# Patient Record
Sex: FEMALE | Race: OTHER | NOT HISPANIC OR LATINO | URBAN - METROPOLITAN AREA
[De-identification: names, ages, dates, MRNs, and addresses within clinical notes are randomized per-mention and may not be internally consistent; named-entity substitution may affect disease eponyms.]

---
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
## 2020-03-24 ENCOUNTER — LABORATORY RESULT (OUTPATIENT)
Age: 20
End: 2020-03-24

## 2020-03-24 ENCOUNTER — APPOINTMENT (OUTPATIENT)
Dept: PEDIATRIC NEPHROLOGY | Facility: CLINIC | Age: 20
End: 2020-03-24
Payer: COMMERCIAL

## 2020-03-24 VITALS
HEART RATE: 59 BPM | HEIGHT: 61.5 IN | DIASTOLIC BLOOD PRESSURE: 62 MMHG | BODY MASS INDEX: 27.51 KG/M2 | SYSTOLIC BLOOD PRESSURE: 164 MMHG | WEIGHT: 147.6 LBS

## 2020-03-24 VITALS — HEART RATE: 55 BPM

## 2020-03-24 PROCEDURE — 99214 OFFICE O/P EST MOD 30 MIN: CPT

## 2020-03-24 NOTE — REASON FOR VISIT
[Follow-Up] : a follow-up visit for [Mother] : mother [FreeTextEntry3] : mid aortic syndrome, renal scars and HTN

## 2020-03-24 NOTE — BIRTH HISTORY
[Premature] : premature [Normal Vaginal Route] : by normal vaginal route [de-identified] : 32 weeks [FreeTextEntry1] : 4lbs 8oz [FreeTextEntry4] : NICU for prematurity

## 2020-03-24 NOTE — PHYSICAL EXAM
[Well Developed] : well developed [Well Nourished] : well nourished [Normal] : alert, oriented as age-appropriate, affect appropriate; no weakness, no facial asymmetry, moves all extremities normal gait- child older than 18 months [de-identified] : no bruit

## 2020-03-31 ENCOUNTER — APPOINTMENT (OUTPATIENT)
Dept: PEDIATRIC NEPHROLOGY | Facility: CLINIC | Age: 20
End: 2020-03-31
Payer: COMMERCIAL

## 2020-03-31 VITALS — SYSTOLIC BLOOD PRESSURE: 93 MMHG | DIASTOLIC BLOOD PRESSURE: 67 MMHG

## 2020-03-31 VITALS — DIASTOLIC BLOOD PRESSURE: 50 MMHG | SYSTOLIC BLOOD PRESSURE: 110 MMHG

## 2020-03-31 PROCEDURE — 99442: CPT | Mod: 95

## 2020-03-31 RX ORDER — HYDROCHLOROTHIAZIDE 12.5 MG/1
12.5 CAPSULE ORAL TWICE DAILY
Refills: 0 | Status: DISCONTINUED | COMMUNITY
Start: 2018-05-09 | End: 2020-03-31

## 2020-03-31 NOTE — REASON FOR VISIT
[Follow-Up] : a follow-up visit for [Mother] : mother [Patient] : patient [FreeTextEntry3] : mid aortic syndrome, reflux nephropathy, HTN. telephonic visit 3:45pm ()

## 2020-04-07 ENCOUNTER — APPOINTMENT (OUTPATIENT)
Dept: PEDIATRIC NEPHROLOGY | Facility: CLINIC | Age: 20
End: 2020-04-07
Payer: COMMERCIAL

## 2020-04-07 LAB
ANION GAP SERPL CALC-SCNC: 15 MMOL/L
BUN SERPL-MCNC: 16 MG/DL
CALCIUM SERPL-MCNC: 9.7 MG/DL
CHLORIDE SERPL-SCNC: 103 MMOL/L
CO2 SERPL-SCNC: 22 MMOL/L
CREAT SERPL-MCNC: 1.1 MG/DL
CREAT SPEC-SCNC: 99 MG/DL
CREAT/PROT UR: 0.1 RATIO
GLUCOSE SERPL-MCNC: 91 MG/DL
POTASSIUM SERPL-SCNC: 4.5 MMOL/L
PROT UR-MCNC: 11 MG/DLG/24H
SODIUM SERPL-SCNC: 140 MMOL/L

## 2020-04-07 PROCEDURE — 99442: CPT

## 2020-04-07 RX ORDER — NIFEDIPINE 60 MG/1
60 TABLET, EXTENDED RELEASE ORAL DAILY
Qty: 60 | Refills: 0 | Status: DISCONTINUED | COMMUNITY
Start: 2020-03-24 | End: 2020-03-31

## 2020-04-07 NOTE — REASON FOR VISIT
[Follow-Up] : a follow-up visit for [Patient] : patient [FreeTextEntry3] : \par mid aortic syndrome, reflux nephropathy, HTN. PERNELL apt. 4:04pm (frankie@ [phone])

## 2020-05-12 ENCOUNTER — APPOINTMENT (OUTPATIENT)
Dept: PEDIATRIC NEPHROLOGY | Facility: CLINIC | Age: 20
End: 2020-05-12

## 2020-05-19 ENCOUNTER — APPOINTMENT (OUTPATIENT)
Dept: PEDIATRIC NEPHROLOGY | Facility: CLINIC | Age: 20
End: 2020-05-19

## 2020-05-26 ENCOUNTER — RX RENEWAL (OUTPATIENT)
Age: 20
End: 2020-05-26

## 2020-06-16 ENCOUNTER — APPOINTMENT (OUTPATIENT)
Dept: PEDIATRIC NEPHROLOGY | Facility: CLINIC | Age: 20
End: 2020-06-16
Payer: COMMERCIAL

## 2020-06-16 VITALS
BODY MASS INDEX: 27.46 KG/M2 | WEIGHT: 149.2 LBS | DIASTOLIC BLOOD PRESSURE: 80 MMHG | SYSTOLIC BLOOD PRESSURE: 154 MMHG | HEIGHT: 62 IN

## 2020-06-16 PROCEDURE — 99214 OFFICE O/P EST MOD 30 MIN: CPT

## 2020-06-16 NOTE — REASON FOR VISIT
[Follow-Up] : a follow-up visit for [Mother] : mother [Patient] : patient [FreeTextEntry3] : hx of mid aortic syndrome, reflux nephropathy and HTN

## 2020-06-16 NOTE — PHYSICAL EXAM
[Well Developed] : well developed [Well Nourished] : well nourished [Normal] : alert, oriented as age-appropriate, affect appropriate; no weakness, no facial asymmetry, moves all extremities normal gait- child older than 18 months [de-identified] : TM not examined [de-identified] : +abdominal bruit

## 2020-07-21 ENCOUNTER — APPOINTMENT (OUTPATIENT)
Dept: PEDIATRIC NEPHROLOGY | Facility: CLINIC | Age: 20
End: 2020-07-21
Payer: COMMERCIAL

## 2020-07-21 VITALS
SYSTOLIC BLOOD PRESSURE: 148 MMHG | BODY MASS INDEX: 28.25 KG/M2 | DIASTOLIC BLOOD PRESSURE: 84 MMHG | HEART RATE: 59 BPM | WEIGHT: 153.5 LBS | HEIGHT: 62 IN

## 2020-07-21 PROCEDURE — 99214 OFFICE O/P EST MOD 30 MIN: CPT

## 2020-07-21 PROCEDURE — 81003 URINALYSIS AUTO W/O SCOPE: CPT | Mod: QW

## 2020-07-21 NOTE — REASON FOR VISIT
[Follow-Up] : a follow-up visit for [Mother] : mother [Patient] : patient [FreeTextEntry3] : mid aortic syndrome, reflux nephropathy, HTN

## 2020-07-21 NOTE — PHYSICAL EXAM
[Well Developed] : well developed [Well Nourished] : well nourished [Normal] : alert, oriented as age-appropriate, affect appropriate; no weakness, no facial asymmetry, moves all extremities normal gait- child older than 18 months [de-identified] : TM not examined [de-identified] : +abdominal bruit

## 2020-07-29 LAB
BILIRUB UR QL STRIP: NEGATIVE
CLARITY UR: CLEAR
COLLECTION METHOD: NORMAL
GLUCOSE UR-MCNC: NEGATIVE
HCG UR QL: 0.2 EU/DL
HGB UR QL STRIP.AUTO: NORMAL
KETONES UR-MCNC: NEGATIVE
LEUKOCYTE ESTERASE UR QL STRIP: NORMAL
NITRITE UR QL STRIP: NORMAL
PH UR STRIP: 5.5
PROT UR STRIP-MCNC: NEGATIVE
SP GR UR STRIP: 1.01

## 2020-08-06 ENCOUNTER — OUTPATIENT (OUTPATIENT)
Dept: OUTPATIENT SERVICES | Facility: HOSPITAL | Age: 20
LOS: 1 days | Discharge: HOME | End: 2020-08-06
Payer: COMMERCIAL

## 2020-08-06 DIAGNOSIS — K08.409 PARTIAL LOSS OF TEETH, UNSPECIFIED CAUSE, UNSPECIFIED CLASS: Chronic | ICD-10-CM

## 2020-08-06 DIAGNOSIS — I77.9 DISORDER OF ARTERIES AND ARTERIOLES, UNSPECIFIED: Chronic | ICD-10-CM

## 2020-08-06 DIAGNOSIS — M31.4 AORTIC ARCH SYNDROME [TAKAYASU]: ICD-10-CM

## 2020-08-06 PROCEDURE — 76775 US EXAM ABDO BACK WALL LIM: CPT | Mod: 26

## 2020-08-25 ENCOUNTER — APPOINTMENT (OUTPATIENT)
Dept: PEDIATRIC NEPHROLOGY | Facility: CLINIC | Age: 20
End: 2020-08-25

## 2020-09-15 ENCOUNTER — RX RENEWAL (OUTPATIENT)
Age: 20
End: 2020-09-15

## 2020-10-01 ENCOUNTER — APPOINTMENT (OUTPATIENT)
Dept: UROGYNECOLOGY | Facility: CLINIC | Age: 20
End: 2020-10-01
Payer: COMMERCIAL

## 2020-10-01 VITALS
DIASTOLIC BLOOD PRESSURE: 85 MMHG | HEIGHT: 62 IN | SYSTOLIC BLOOD PRESSURE: 166 MMHG | WEIGHT: 150 LBS | HEART RATE: 57 BPM | BODY MASS INDEX: 27.6 KG/M2

## 2020-10-01 PROCEDURE — 99213 OFFICE O/P EST LOW 20 MIN: CPT

## 2020-10-01 NOTE — COUNSELING
[FreeTextEntry1] : If you feel like you have an infection it is important for you to call our office and we will arrange testing of your urine.\par \par Please continue taking Oxybutynin ER 10 mg once a day for frequency. Refills sent to your pharmacy.\par \par Please schedule an appointment for bladder scan to check that you're emptying your bladder well. \par \par Please call my office if you have any issues with the cost or side effects of the medication. \par \par Schedule a 12 months follow up med check appointment.\par

## 2020-10-01 NOTE — DISCUSSION/SUMMARY
[FreeTextEntry1] : Urge Incontinence\par Patient happy with Oxybutynin ER 10mg QD\par Refills provided. 90 days supply with 3 refills.\par Precautions reviewed.\par Bladder sono ordered for PVR check. Spoke to pt and her mom on Facetime. \par Will return in 12 months for follow up or earlier if she has any issues.\par \par \par

## 2020-11-04 ENCOUNTER — APPOINTMENT (OUTPATIENT)
Dept: PEDIATRIC CARDIOLOGY | Facility: CLINIC | Age: 20
End: 2020-11-04
Payer: COMMERCIAL

## 2020-11-04 VITALS
SYSTOLIC BLOOD PRESSURE: 182 MMHG | RESPIRATION RATE: 16 BRPM | HEART RATE: 70 BPM | DIASTOLIC BLOOD PRESSURE: 81 MMHG | OXYGEN SATURATION: 98 % | BODY MASS INDEX: 26.44 KG/M2 | WEIGHT: 145.51 LBS | HEIGHT: 62.4 IN

## 2020-11-04 PROCEDURE — 93320 DOPPLER ECHO COMPLETE: CPT

## 2020-11-04 PROCEDURE — 99072 ADDL SUPL MATRL&STAF TM PHE: CPT

## 2020-11-04 PROCEDURE — 93325 DOPPLER ECHO COLOR FLOW MAPG: CPT

## 2020-11-04 PROCEDURE — 93000 ELECTROCARDIOGRAM COMPLETE: CPT | Mod: GC

## 2020-11-04 PROCEDURE — 93303 ECHO TRANSTHORACIC: CPT

## 2020-11-04 PROCEDURE — 99215 OFFICE O/P EST HI 40 MIN: CPT | Mod: GC,25

## 2020-11-08 NOTE — PHYSICAL EXAM
[General Appearance - Alert] : alert [General Appearance - In No Acute Distress] : in no acute distress [General Appearance - Well Nourished] : well nourished [General Appearance - Well Developed] : well developed [General Appearance - Well-Appearing] : well appearing [Appearance Of Head] : the head was normocephalic [Facies] : there were no dysmorphic facial features [Sclera] : the conjunctiva were normal [Outer Ear] : the ears and nose were normal in appearance [Examination Of The Oral Cavity] : mucous membranes were moist and pink [Auscultation Breath Sounds / Voice Sounds] : breath sounds clear to auscultation bilaterally [Normal Chest Appearance] : the chest was normal in appearance [Apical Impulse] : quiet precordium with normal apical impulse [Heart Rate And Rhythm] : normal heart rate and rhythm [Heart Sounds] : normal S1 and S2 [No Murmur] : no murmurs  [Heart Sounds Gallop] : no gallops [Heart Sounds Pericardial Friction Rub] : no pericardial rub [Heart Sounds Click] : no clicks [Edema] : no edema [Capillary Refill Test] : normal capillary refill [2+] : left 2+ [1+] : left 1+ [Bruit] : a bruit was heard [Bowel Sounds] : normal bowel sounds [Abdomen Soft] : soft [Nondistended] : nondistended [Abdomen Tenderness] : non-tender [Nail Clubbing] : no clubbing  or cyanosis of the fingers [Motor Tone] : normal muscle strength and tone [Cervical Lymph Nodes Enlarged Anterior] : The anterior cervical nodes were normal [Cervical Lymph Nodes Enlarged Posterior] : The posterior cervical nodes were normal [] : no rash [Skin Lesions] : no lesions [Skin Turgor] : normal turgor [Demonstrated Behavior - Infant Nonreactive To Parents] : interactive [Mood] : mood and affect were appropriate for age [Demonstrated Behavior] : normal behavior

## 2020-11-13 NOTE — REASON FOR VISIT
[Systemic Hypertension] : systemic hypertension [Coarctation Of The Aorta] : coarctation of the aorta [Patient] : patient [Follow-Up] : a follow-up visit for [FreeTextEntry1] : MidAortic Syndrome

## 2020-11-13 NOTE — DISCUSSION/SUMMARY
[May participate in all age-appropriate activities] : [unfilled] May participate in all age-appropriate activities. [Influenza vaccine is recommended] : Influenza vaccine is recommended [Needs SBE Prophylaxis] : [unfilled] does not need bacterial endocarditis prophylaxis [FreeTextEntry1] : \par \par

## 2020-11-13 NOTE — HISTORY OF PRESENT ILLNESS
[FreeTextEntry1] : We had the pleasure of meeting Tisha Dorman for follow up evaluation at [facility] on November 4th, 2020.\par \par As you know, Tisha is a 20-year-old young lady with middle aortic syndrome and systemic hypertension s/p stent placement in the infrarenal aorta. She had a progressively diminutive descending abdominal aorta with near complete obliteration of the lumen in the infrarenal portion. Post stent placement there was pulsatile flow established and a residual gradient of 20 mm mercury. She had significant relief of lower extremity claudication but remains hypertensive for which she is on 2 antihypertensive medications (Nifedipine and Enalapril). She recently (Aug 2020) underwent US abdominal aortic doppler that revealed progressive narrowing of the aorta above the level of renal arteries, the stented area of the aorta was not well visualized. \par \par She continues to follow with nephrology for hypertension, atrophic left kidney with decreased function, bladder dysmotility and recurrent UTI infections. She is on Nifidipine 30mg once daily and Enalapril 5 mg daily. \par \par Tisha today has no complaints. She reports compliance with her medications with no chest pain, headaches, visual changes, palpitations, dyspnea, syncope or pre-syncope. She also denies symptoms of claudication.

## 2020-11-13 NOTE — CONSULT LETTER
[Today's Date] : [unfilled] [Name] : Name: [unfilled] [] : : ~~ [Today's Date:] : [unfilled] [Consult] : I had the pleasure of evaluating your patient, [unfilled]. My full evaluation follows. [Consult - Single Provider] : Thank you very much for allowing me to participate in the care of this patient. If you have any questions, please do not hesitate to contact me. [Sincerely,] : Sincerely, [FreeTextEntry4] : Benito Partida [FreeTextEntry5] : Nephrology [FreeTextEntry6] : [facility] [de-identified] : Addy Briggs MD\par Fellow, Pediatric Cardiology\par [facility]\par \par Juilen Bhatt MD\par Director, Pediatric catheterization Lab\par Health system\par , [employer]\par Telephone: [phone]\par [phone]\par \par

## 2020-11-13 NOTE — REVIEW OF SYSTEMS
[Feeling Poorly] : not feeling poorly (malaise) [Fever] : no fever [Wgt Loss (___ Lbs)] : no recent weight loss [Pallor] : not pale [Eye Discharge] : no eye discharge [Redness] : no redness [Change in Vision] : no change in vision [Nasal Stuffiness] : no nasal congestion [Sore Throat] : no sore throat [Earache] : no earache [Loss Of Hearing] : no hearing loss [Cyanosis] : no cyanosis [Edema] : no edema [Diaphoresis] : not diaphoretic [Chest Pain] : no chest pain or discomfort [Exercise Intolerance] : no persistence of exercise intolerance [Palpitations] : no palpitations [Orthopnea] : no orthopnea [Fast HR] : no tachycardia [Tachypnea] : not tachypneic [Wheezing] : no wheezing [Cough] : no cough [Shortness Of Breath] : not expressed as feeling short of breath [Vomiting] : no vomiting [Diarrhea] : no diarrhea [Abdominal Pain] : no abdominal pain [Decrease In Appetite] : appetite not decreased [Fainting (Syncope)] : no fainting [Seizure] : no seizures [Headache] : no headache [Dizziness] : no dizziness [Limping] : no limping [Joint Pains] : no arthralgias [Joint Swelling] : no joint swelling [Rash] : no rash [Wound problems] : no wound problems [Easy Bruising] : no tendency for easy bruising [Swollen Glands] : no lymphadenopathy [Easy Bleeding] : no ~M tendency for easy bleeding [Nosebleeds] : no epistaxis [Sleep Disturbances] : ~T no sleep disturbances [Hyperactive] : no hyperactive behavior [Depression] : no depression [Anxiety] : no anxiety [Failure To Thrive] : no failure to thrive [Short Stature] : short stature was not noted [Jitteriness] : no jitteriness [Heat/Cold Intolerance] : no temperature intolerance [Dec Urine Output] : no oliguria [FreeTextEntry1] : leg pain

## 2020-11-13 NOTE — CARDIOLOGY SUMMARY
[de-identified] : 11/04/2020 [FreeTextEntry1] : NSR, normal axis, normal intervals, non-specific T wave abnormalities, otherwise intervals normal. [de-identified] : 11/04/2020 [FreeTextEntry2] : 1.History of mid-thoracic aorta syndrome, s/p transcatheter stenting of the infra-renal abdominal aorta.  Persistent hypertension on medications\par 2. Follow up study mainly to evaluate aortic arch.\par 3. Hypoplasia of the aortic isthmus. There is a fibrous ridge noted in the aortic isthmus. Doppler profile shows persistence of antegrade flow in diastole (coarctation pattern).  Peak systolic gradient across  the aortic isthmus of 24 mmHg, mean gradient of 14 mmHg (CW Doppler). \par The proximal and distal thoracic descending aorta is not visualized adequately.\par 4. Continuous antegrade flow in the descending aorta at the level of diaphragm.\par 5. No evidence of left ventricular hypertrophy.\par 6. Normal left ventricular diastolic function.\par 7. Left ventricular ejection fraction by 5/6 Area x Length is mildly decreased at 51 %.\par 8. Normal right ventricular morphology with qualitatively normal size and systolic function.\par 9. No pericardial effusion.\par 10. Compared to the previous echocardiogram of 10/13/2016; no significant change\par \par

## 2020-12-16 ENCOUNTER — APPOINTMENT (OUTPATIENT)
Dept: MRI IMAGING | Facility: HOSPITAL | Age: 20
End: 2020-12-16

## 2021-01-14 ENCOUNTER — APPOINTMENT (OUTPATIENT)
Dept: MRI IMAGING | Facility: HOSPITAL | Age: 21
End: 2021-01-14

## 2021-01-14 ENCOUNTER — RESULT REVIEW (OUTPATIENT)
Age: 21
End: 2021-01-14

## 2021-01-14 ENCOUNTER — OUTPATIENT (OUTPATIENT)
Dept: OUTPATIENT SERVICES | Age: 21
LOS: 1 days | End: 2021-01-14

## 2021-01-14 DIAGNOSIS — I77.9 DISORDER OF ARTERIES AND ARTERIOLES, UNSPECIFIED: Chronic | ICD-10-CM

## 2021-01-14 DIAGNOSIS — K08.409 PARTIAL LOSS OF TEETH, UNSPECIFIED CAUSE, UNSPECIFIED CLASS: Chronic | ICD-10-CM

## 2021-01-14 DIAGNOSIS — Q25.1 COARCTATION OF AORTA: ICD-10-CM

## 2021-01-22 ENCOUNTER — NON-APPOINTMENT (OUTPATIENT)
Age: 21
End: 2021-01-22

## 2021-01-26 ENCOUNTER — RX RENEWAL (OUTPATIENT)
Age: 21
End: 2021-01-26

## 2021-02-03 ENCOUNTER — APPOINTMENT (OUTPATIENT)
Dept: PEDIATRIC RHEUMATOLOGY | Facility: CLINIC | Age: 21
End: 2021-02-03

## 2021-02-05 ENCOUNTER — NON-APPOINTMENT (OUTPATIENT)
Age: 21
End: 2021-02-05

## 2021-02-23 ENCOUNTER — APPOINTMENT (OUTPATIENT)
Dept: PEDIATRIC NEPHROLOGY | Facility: CLINIC | Age: 21
End: 2021-02-23
Payer: COMMERCIAL

## 2021-02-23 VITALS
WEIGHT: 158.8 LBS | HEART RATE: 67 BPM | BODY MASS INDEX: 29.22 KG/M2 | DIASTOLIC BLOOD PRESSURE: 93 MMHG | HEIGHT: 62 IN | SYSTOLIC BLOOD PRESSURE: 198 MMHG

## 2021-02-23 VITALS — SYSTOLIC BLOOD PRESSURE: 188 MMHG | DIASTOLIC BLOOD PRESSURE: 106 MMHG

## 2021-02-23 VITALS — TEMPERATURE: 98 F

## 2021-02-23 PROCEDURE — 99072 ADDL SUPL MATRL&STAF TM PHE: CPT

## 2021-02-23 PROCEDURE — 99214 OFFICE O/P EST MOD 30 MIN: CPT

## 2021-02-23 PROCEDURE — 81003 URINALYSIS AUTO W/O SCOPE: CPT | Mod: QW

## 2021-02-23 RX ORDER — NIFEDIPINE 30 MG/1
30 TABLET, EXTENDED RELEASE ORAL DAILY
Qty: 30 | Refills: 0 | Status: DISCONTINUED | COMMUNITY
Start: 2020-07-21 | End: 2021-02-23

## 2021-02-23 NOTE — REASON FOR VISIT
[Follow-Up] : a follow-up visit for [Patient] : patient [Mother] : mother [FreeTextEntry3] : mid aortic syndrome, reflux nephropathy, HTN

## 2021-02-23 NOTE — PHYSICAL EXAM
[Well Developed] : well developed [Well Nourished] : well nourished [Normal] : alert, oriented as age-appropriate, affect appropriate; no weakness, no facial asymmetry, moves all extremities normal gait- child older than 18 months [de-identified] : no oral lesion [de-identified] : occasional PVC [de-identified] : no abdominal bruit

## 2021-03-02 ENCOUNTER — TRANSCRIPTION ENCOUNTER (OUTPATIENT)
Age: 21
End: 2021-03-02

## 2021-03-02 ENCOUNTER — LABORATORY RESULT (OUTPATIENT)
Age: 21
End: 2021-03-02

## 2021-03-02 ENCOUNTER — APPOINTMENT (OUTPATIENT)
Dept: PEDIATRIC RHEUMATOLOGY | Facility: CLINIC | Age: 21
End: 2021-03-02
Payer: COMMERCIAL

## 2021-03-02 VITALS — DIASTOLIC BLOOD PRESSURE: 76 MMHG | SYSTOLIC BLOOD PRESSURE: 123 MMHG

## 2021-03-02 VITALS
HEIGHT: 62.72 IN | WEIGHT: 156.53 LBS | DIASTOLIC BLOOD PRESSURE: 85 MMHG | SYSTOLIC BLOOD PRESSURE: 174 MMHG | BODY MASS INDEX: 28.08 KG/M2 | HEART RATE: 58 BPM | TEMPERATURE: 96.8 F

## 2021-03-02 LAB
BILIRUB UR QL STRIP: NEGATIVE
CLARITY UR: CLEAR
COLLECTION METHOD: NORMAL
GLUCOSE UR-MCNC: NEGATIVE
HCG UR QL: 0.2 EU/DL
HGB UR QL STRIP.AUTO: NORMAL
KETONES UR-MCNC: NEGATIVE
LEUKOCYTE ESTERASE UR QL STRIP: NORMAL
NITRITE UR QL STRIP: POSITIVE
PH UR STRIP: 6.5
PROT UR STRIP-MCNC: NEGATIVE
SP GR UR STRIP: 1.02

## 2021-03-02 PROCEDURE — 99072 ADDL SUPL MATRL&STAF TM PHE: CPT

## 2021-03-02 PROCEDURE — 99215 OFFICE O/P EST HI 40 MIN: CPT

## 2021-03-03 VITALS — DIASTOLIC BLOOD PRESSURE: 82 MMHG | SYSTOLIC BLOOD PRESSURE: 140 MMHG

## 2021-03-03 LAB
ALBUMIN SERPL ELPH-MCNC: 4.4 G/DL
ALP BLD-CCNC: 81 U/L
ALT SERPL-CCNC: 25 U/L
ANION GAP SERPL CALC-SCNC: 10 MMOL/L
APPEARANCE: ABNORMAL
AST SERPL-CCNC: 12 U/L
B2 GLYCOPROT1 AB SER QL: NEGATIVE
BASOPHILS # BLD AUTO: 0.05 K/UL
BASOPHILS NFR BLD AUTO: 0.6 %
BILIRUB SERPL-MCNC: 0.6 MG/DL
BILIRUBIN URINE: NEGATIVE
BLOOD URINE: NORMAL
BUN SERPL-MCNC: 16 MG/DL
C3 SERPL-MCNC: 108 MG/DL
C4 SERPL-MCNC: 20 MG/DL
CALCIUM SERPL-MCNC: 9.9 MG/DL
CARDIOLIPIN AB SER IA-ACNC: NEGATIVE
CHLORIDE SERPL-SCNC: 105 MMOL/L
CO2 SERPL-SCNC: 23 MMOL/L
COLOR: NORMAL
CONFIRM: 28.8 SEC
CREAT SERPL-MCNC: 1.13 MG/DL
CREAT SPEC-SCNC: 69 MG/DL
CREAT/PROT UR: 0.2 RATIO
CRP SERPL-MCNC: <3 MG/L
DRVVT IMM 1:2 NP PPP: NORMAL
DRVVT SCREEN TO CONFIRM RATIO: 0.95 RATIO
DSDNA AB SER-ACNC: <12 IU/ML
ENA RNP AB SER IA-ACNC: <0.2 AL
ENA SM AB SER IA-ACNC: <0.2 AL
ENA SS-A AB SER IA-ACNC: <0.2 AL
ENA SS-B AB SER IA-ACNC: <0.2 AL
EOSINOPHIL # BLD AUTO: 0.05 K/UL
EOSINOPHIL NFR BLD AUTO: 0.6 %
ERYTHROCYTE [SEDIMENTATION RATE] IN BLOOD BY WESTERGREN METHOD: 13 MM/HR
GLUCOSE QUALITATIVE U: NEGATIVE
GLUCOSE SERPL-MCNC: 86 MG/DL
HCT VFR BLD CALC: 41.3 %
HGB BLD-MCNC: 14.2 G/DL
IMM GRANULOCYTES NFR BLD AUTO: 0.2 %
KETONES URINE: NEGATIVE
LEUKOCYTE ESTERASE URINE: ABNORMAL
LYMPHOCYTES # BLD AUTO: 2.05 K/UL
LYMPHOCYTES NFR BLD AUTO: 22.9 %
MAN DIFF?: NORMAL
MCHC RBC-ENTMCNC: 29.3 PG
MCHC RBC-ENTMCNC: 34.4 GM/DL
MCV RBC AUTO: 85.2 FL
MONOCYTES # BLD AUTO: 0.63 K/UL
MONOCYTES NFR BLD AUTO: 7 %
MPO AB + PR3 PNL SER: NORMAL
NEUTROPHILS # BLD AUTO: 6.14 K/UL
NEUTROPHILS NFR BLD AUTO: 68.7 %
NITRITE URINE: POSITIVE
PH URINE: 6.5
PLATELET # BLD AUTO: 217 K/UL
POTASSIUM SERPL-SCNC: 4.1 MMOL/L
PROT SERPL-MCNC: 7.5 G/DL
PROT UR-MCNC: 15 MG/DL
PROTEIN URINE: NORMAL
RBC # BLD: 4.85 M/UL
RBC # FLD: 12.4 %
SCREEN DRVVT: 36.7 SEC
SODIUM SERPL-SCNC: 138 MMOL/L
SPECIFIC GRAVITY URINE: 1.01
T PALLIDUM AB SER QL IA: NEGATIVE
UROBILINOGEN URINE: NORMAL
VWF AG PPP IA-ACNC: 210 %
WBC # FLD AUTO: 8.94 K/UL

## 2021-03-04 ENCOUNTER — NON-APPOINTMENT (OUTPATIENT)
Age: 21
End: 2021-03-04

## 2021-03-04 LAB
ANA SER IF-ACNC: NEGATIVE
HCG SERPL-MCNC: <1 MIU/ML
M TB IFN-G BLD-IMP: NEGATIVE
QUANTIFERON TB PLUS MITOGEN MINUS NIL: 8.11 IU/ML
QUANTIFERON TB PLUS NIL: 0.04 IU/ML
QUANTIFERON TB PLUS TB1 MINUS NIL: 0 IU/ML
QUANTIFERON TB PLUS TB2 MINUS NIL: 0 IU/ML
RHEUMATOID FACT SER QL: <10 IU/ML

## 2021-03-04 NOTE — PHYSICAL EXAM
[Cardiac Auscultation] : normal cardiac auscultation  [Respiratory Effort] : normal respiratory effort [Auscultation] : lungs clear to auscultation [Normal] : normal [Oropharynx] : normal oropharynx [Palate] : normal palate [Grossly Intact] : grossly intact [Rash] : no rash [Ulcers] : no ulcers [Peripheral Edema] : no peripheral edema  [Tenderness] : non tender [Cervical] : no cervical adenopathy [FreeTextEntry5] : abdominal bruit auscultated, decreased but palpable femoral/DP/PT pulses bilaterally, normal carotid and radial pulses bilaterally [de-identified] : mild left ankle swelling/possible effusion, no current tenderness; no other joint pain or swelling, full range of motion throughout

## 2021-03-04 NOTE — REVIEW OF SYSTEMS
[NI] : Endocrine [Nl] : Hematologic/Lymphatic [Headache] : headache [Immunizations are up to date] : Immunizations are up to date [Limping] : no limping [Joint Pains] : no arthralgias [Joint Swelling] : no joint swelling [Back Pain] : ~T no back pain [AM Stiffness] : no am stiffness [FreeTextEntry2] : intermittent claudication in calves as in HPI [FreeTextEntry1] : records maintained by [facility]

## 2021-03-04 NOTE — HISTORY OF PRESENT ILLNESS
[de-identified] : last seen May 2018, lost to f/u since this time [FreeTextEntry1] : Tisha never performed further vascular imaging as recommended and did not f/u as recommended despite multiple attempts to contact her and schedule f/u in 2018.\par \par Since this time she has been intermittently followed by nephrology for continued severe hypertension and has been non-compliant with treatment.  Most recently saw Dr. Partida last week and severely hypertensive to 198/93 in RUE, 188/106 in RLE.  Patient was taking nifedipine PRN reportedly before this but was just started on enalapril 5 mg daily which she states she has now been taking reliably since last week.  She is however still quite hypertensive today and discussed patient with Dr. Partida who will have televisit with family today and plans to increase anti-hypertensives with close f/u.  Hospitalization was previously discussed by Dr. Partida last week with family due to concern for severe and uncontrolled hypertension and was refused by the patient.  Discussed this again today with the family.\par \par Patient has seen cardiology and is to have repeat cath with likely repeat stent placement sometime in the next month.\par \par Patient reports intermittent headaches a few times a month.  She checks BP at home and is persistently hypertensive to the 140s-170s/80s-90s according to mother's records.  She denies any dizziness/syncope and denies any chest pain or palpitations.  No focal neurological deficits.\par \par She notes intermittent pain in her calves/thighs after standing for long periods or walking for long distances, improves with rest.  \par \par No joint pain or swelling noted.  \par \par Is constipated - stools twice a week with straining.   No nausea/emesis/diarrhea/severe abdominal pain/blood in stool.\par \par No fever, rash, or recent illness.  No eye pain/redness/change in vision.  No sores in the mouth or nose.  No difficulty swallowing.   No weakness.  No urinary changes.  No other new symptoms.\par \par  [Weight Loss] : no weight loss [Malaise] : no malaise [Fever] : no fever [Malar Facial Rash] : no malar facial rash [Skin Lesions] : no skin lesions [Oral Ulcers] : no oral ulcers [Chest Pain] : no chest pain [Arthralgias] : no arthralgias [Joint Swelling] : no joint swelling [Difficulty Walking] : no difficulty walking [Dyspnea] : no dyspnea [Myalgias] : no myalgias [Muscle Weakness] : no muscle weakness [Muscle Spasms] : no muscle spasms [Visual Changes] : no visual changes [Eye Pain] : no eye pain [Eye Redness] : no eye redness

## 2021-03-04 NOTE — CONSULT LETTER
[Dear  ___] : Dear  [unfilled], [Please see my note below.] : Please see my note below. [Consult Closing:] : Thank you very much for allowing me to participate in the care of this patient.  If you have any questions, please do not hesitate to contact me. [Sincerely,] : Sincerely, [Yaquelin Wagner MD] : Yaquelin Wagner MD [[facility]] : [facility]  [Courtesy Letter:] : I had the pleasure of seeing your patient, [unfilled], in my office today. [FreeTextEntry2] : Linda Duffy, LEOBARDO\par [address] [place] [address]\[place] [address] [FreeTextEntry3] : Yaquelin Wagner MD\par [facility]

## 2021-03-05 ENCOUNTER — NON-APPOINTMENT (OUTPATIENT)
Age: 21
End: 2021-03-05

## 2021-03-09 ENCOUNTER — APPOINTMENT (OUTPATIENT)
Dept: PEDIATRIC NEPHROLOGY | Facility: CLINIC | Age: 21
End: 2021-03-09
Payer: COMMERCIAL

## 2021-03-09 VITALS — SYSTOLIC BLOOD PRESSURE: 161 MMHG | HEART RATE: 79 BPM | DIASTOLIC BLOOD PRESSURE: 84 MMHG

## 2021-03-09 PROCEDURE — 99213 OFFICE O/P EST LOW 20 MIN: CPT | Mod: 95

## 2021-03-09 NOTE — REASON FOR VISIT
[Follow-Up] : a follow-up visit for [Patient] : patient [FreeTextEntry3] : midaortic syndrome, reflux nephropathy, HTN. Maureen consented by patient @ 12:45pm

## 2021-03-12 ENCOUNTER — NON-APPOINTMENT (OUTPATIENT)
Age: 21
End: 2021-03-12

## 2021-03-15 ENCOUNTER — NON-APPOINTMENT (OUTPATIENT)
Age: 21
End: 2021-03-15

## 2021-03-16 ENCOUNTER — NON-APPOINTMENT (OUTPATIENT)
Age: 21
End: 2021-03-16

## 2021-03-22 ENCOUNTER — NON-APPOINTMENT (OUTPATIENT)
Age: 21
End: 2021-03-22

## 2021-03-23 ENCOUNTER — APPOINTMENT (OUTPATIENT)
Dept: PEDIATRIC NEPHROLOGY | Facility: CLINIC | Age: 21
End: 2021-03-23

## 2021-03-23 VITALS — SYSTOLIC BLOOD PRESSURE: 168 MMHG | DIASTOLIC BLOOD PRESSURE: 82 MMHG | HEART RATE: 59 BPM

## 2021-03-23 VITALS — SYSTOLIC BLOOD PRESSURE: 147 MMHG | HEART RATE: 60 BPM | DIASTOLIC BLOOD PRESSURE: 71 MMHG

## 2021-03-24 ENCOUNTER — NON-APPOINTMENT (OUTPATIENT)
Age: 21
End: 2021-03-24

## 2021-03-25 ENCOUNTER — NON-APPOINTMENT (OUTPATIENT)
Age: 21
End: 2021-03-25

## 2021-03-27 ENCOUNTER — APPOINTMENT (OUTPATIENT)
Dept: DISASTER EMERGENCY | Facility: CLINIC | Age: 21
End: 2021-03-27

## 2021-03-30 ENCOUNTER — APPOINTMENT (OUTPATIENT)
Dept: PEDIATRIC NEPHROLOGY | Facility: CLINIC | Age: 21
End: 2021-03-30
Payer: COMMERCIAL

## 2021-03-30 ENCOUNTER — LABORATORY RESULT (OUTPATIENT)
Age: 21
End: 2021-03-30

## 2021-03-30 ENCOUNTER — APPOINTMENT (OUTPATIENT)
Dept: PEDIATRIC RHEUMATOLOGY | Facility: CLINIC | Age: 21
End: 2021-03-30
Payer: COMMERCIAL

## 2021-03-30 VITALS
WEIGHT: 158.07 LBS | HEIGHT: 62.64 IN | TEMPERATURE: 98 F | BODY MASS INDEX: 28.36 KG/M2 | SYSTOLIC BLOOD PRESSURE: 153 MMHG | HEART RATE: 61 BPM | DIASTOLIC BLOOD PRESSURE: 81 MMHG

## 2021-03-30 VITALS
BODY MASS INDEX: 28.17 KG/M2 | DIASTOLIC BLOOD PRESSURE: 83 MMHG | WEIGHT: 157 LBS | HEART RATE: 55 BPM | SYSTOLIC BLOOD PRESSURE: 153 MMHG | HEIGHT: 62.64 IN

## 2021-03-30 VITALS — DIASTOLIC BLOOD PRESSURE: 80 MMHG | HEART RATE: 55 BPM | SYSTOLIC BLOOD PRESSURE: 152 MMHG

## 2021-03-30 DIAGNOSIS — R09.89 OTHER SPECIFIED SYMPTOMS AND SIGNS INVOLVING THE CIRCULATORY AND RESPIRATORY SYSTEMS: ICD-10-CM

## 2021-03-30 DIAGNOSIS — Z87.39 PERSONAL HISTORY OF OTHER DISEASES OF THE MUSCULOSKELETAL SYSTEM AND CONNECTIVE TISSUE: ICD-10-CM

## 2021-03-30 PROCEDURE — 99072 ADDL SUPL MATRL&STAF TM PHE: CPT

## 2021-03-30 PROCEDURE — 99215 OFFICE O/P EST HI 40 MIN: CPT

## 2021-03-30 PROCEDURE — 98960 EDU&TRN PT SELF-MGMT NQHP 1: CPT

## 2021-03-30 PROCEDURE — 99214 OFFICE O/P EST MOD 30 MIN: CPT

## 2021-03-30 NOTE — REASON FOR VISIT
[Follow-Up] : a follow-up visit for [Patient] : patient [FreeTextEntry3] : MID-AORTIC SYNDROME, REFLUX NEPHROPATHY, HTN

## 2021-03-30 NOTE — HISTORY OF PRESENT ILLNESS
[FreeTextEntry1] : Tisha has felt well overall since last visit.\par \bridget Remains with hypertension being managed by nephrology - enalapril increased to 10 mg daily ~ 2 weeks ago per patient which she reports she is taking reliably.  Has f/u with nephrology today.  Is still hypertensive today in clinic.\par \bridget Was scheduled to have brain MRI recently but arrived an hour late so had to reschedule - pending in a few weeks.\par \bridget Is scheduled for cath/stent placement with cardiology end of April.\par \par Tisha reports headaches are better recently.  She denies any dizziness/syncope and denies any chest pain or palpitations. No focal neurological deficits.\par \par She still notes intermittent pain in her calves/thighs after standing for long periods or walking for long distances, improves with rest. \par \par No joint pain or swelling noted. \par \bridget Is constipated - stools twice a week with straining. No nausea/emesis/diarrhea/severe abdominal pain/blood in stool.\par yaneli Had urine culture sent with E.coli >100,000 at last visit - was not a clean catch, was asked to repeat urine culture but has not gone yet.  She denies any urinary symptoms.  Will repeat clean catch urine culture today.\par \par No fever, rash, or recent illness. No eye pain/redness/change in vision. No sores in the mouth or nose. No difficulty swallowing. No weakness. No urinary changes. No other new symptoms.\par \par  [Weight Loss] : no weight loss [Malaise] : no malaise [Fever] : no fever [Malar Facial Rash] : no malar facial rash [Skin Lesions] : no skin lesions [Oral Ulcers] : no oral ulcers [Chest Pain] : no chest pain [Arthralgias] : no arthralgias [Joint Swelling] : no joint swelling [Difficulty Walking] : no difficulty walking [Dyspnea] : no dyspnea [Myalgias] : no myalgias [Muscle Weakness] : no muscle weakness [Muscle Spasms] : no muscle spasms [Visual Changes] : no visual changes [Eye Pain] : no eye pain [Eye Redness] : no eye redness

## 2021-03-30 NOTE — PHYSICAL EXAM
[Palate] : normal palate [Cardiac Auscultation] : normal cardiac auscultation  [Respiratory Effort] : normal respiratory effort [Acute distress] : no acute distress [Rash] : no rash [PERRLA] : DANA [Erythematous Conjunctiva] : nonerythematous conjunctiva [Erythematous Oropharynx] : nonerythematous oropharynx [Ulcers] : no ulcers [Lesions] : no lesions [S1, S2 Present] : S1, S2 present [Murmurs] : no murmurs [Peripheral Edema] : no peripheral edema  [Clear to auscultation] : clear to auscultation [Soft] : soft [NonTender] : non tender [Non Distended] : non distended [Normal Bowel Sounds] : normal bowel sounds [No Hepatosplenomegaly] : no hepatosplenomegaly [No Abnormal Lymph Nodes Palpated] : no abnormal lymph nodes palpated [Range Of Motion] : full range of motion [Joint effusions] : no joint effusions [Intact Judgement] : intact judgement  [Insight Insight] : intact insight [FreeTextEntry5] : abdominal bruit auscultated, decreased but palpable femoral/DP/PT pulses bilaterally, normal carotid and radial pulses bilaterally [de-identified] : no current joint pain or swelling, full range of motion throughout

## 2021-03-30 NOTE — REASON FOR VISIT
[Patient] : patient [Follow-Up: _____] : [unfilled] is  being seen for a [unfilled] follow-up visit [Other: _____] : [unfilled]

## 2021-03-30 NOTE — CONSULT LETTER
[Dear  ___] : Dear  [unfilled], [Courtesy Letter:] : I had the pleasure of seeing your patient, [unfilled], in my office today. [Please see my note below.] : Please see my note below. [Consult Closing:] : Thank you very much for allowing me to participate in the care of this patient.  If you have any questions, please do not hesitate to contact me. [Sincerely,] : Sincerely, [Yaquelin Wagner MD] : Yaquelin Wagner MD [[facility]] : [facility]  [FreeTextEntry2] : Linda Duffy, LEOBARDO\par [address] [place] [address]\[place] [address] [FreeTextEntry3] : Yaquelin Wagner MD\par [facility]

## 2021-03-31 ENCOUNTER — NON-APPOINTMENT (OUTPATIENT)
Age: 21
End: 2021-03-31

## 2021-04-05 ENCOUNTER — OUTPATIENT (OUTPATIENT)
Dept: OUTPATIENT SERVICES | Age: 21
LOS: 1 days | End: 2021-04-05

## 2021-04-05 ENCOUNTER — APPOINTMENT (OUTPATIENT)
Dept: PEDIATRIC RHEUMATOLOGY | Facility: CLINIC | Age: 21
End: 2021-04-05

## 2021-04-05 ENCOUNTER — NON-APPOINTMENT (OUTPATIENT)
Age: 21
End: 2021-04-05

## 2021-04-05 ENCOUNTER — LABORATORY RESULT (OUTPATIENT)
Age: 21
End: 2021-04-05

## 2021-04-05 VITALS
OXYGEN SATURATION: 98 % | TEMPERATURE: 97 F | HEIGHT: 62.48 IN | RESPIRATION RATE: 17 BRPM | SYSTOLIC BLOOD PRESSURE: 166 MMHG | WEIGHT: 156.31 LBS | HEART RATE: 60 BPM | DIASTOLIC BLOOD PRESSURE: 81 MMHG

## 2021-04-05 DIAGNOSIS — Z91.89 OTHER SPECIFIED PERSONAL RISK FACTORS, NOT ELSEWHERE CLASSIFIED: ICD-10-CM

## 2021-04-05 DIAGNOSIS — M31.4 AORTIC ARCH SYNDROME [TAKAYASU]: ICD-10-CM

## 2021-04-05 DIAGNOSIS — I77.9 DISORDER OF ARTERIES AND ARTERIOLES, UNSPECIFIED: Chronic | ICD-10-CM

## 2021-04-05 DIAGNOSIS — K08.409 PARTIAL LOSS OF TEETH, UNSPECIFIED CAUSE, UNSPECIFIED CLASS: Chronic | ICD-10-CM

## 2021-04-05 LAB
ANION GAP SERPL CALC-SCNC: 11 MMOL/L — SIGNIFICANT CHANGE UP (ref 7–14)
BLD GP AB SCN SERPL QL: NEGATIVE — SIGNIFICANT CHANGE UP
BUN SERPL-MCNC: 20 MG/DL — SIGNIFICANT CHANGE UP (ref 7–23)
CALCIUM SERPL-MCNC: 9.5 MG/DL — SIGNIFICANT CHANGE UP (ref 8.4–10.5)
CHLORIDE SERPL-SCNC: 105 MMOL/L — SIGNIFICANT CHANGE UP (ref 98–107)
CO2 SERPL-SCNC: 23 MMOL/L — SIGNIFICANT CHANGE UP (ref 22–31)
CREAT SERPL-MCNC: 1.13 MG/DL — SIGNIFICANT CHANGE UP (ref 0.5–1.3)
GLUCOSE SERPL-MCNC: 88 MG/DL — SIGNIFICANT CHANGE UP (ref 70–99)
HCT VFR BLD CALC: 39.4 % — SIGNIFICANT CHANGE UP (ref 34.5–45)
HGB BLD-MCNC: 13.5 G/DL — SIGNIFICANT CHANGE UP (ref 11.5–15.5)
MCHC RBC-ENTMCNC: 28.8 PG — SIGNIFICANT CHANGE UP (ref 27–34)
MCHC RBC-ENTMCNC: 34.3 GM/DL — SIGNIFICANT CHANGE UP (ref 32–36)
MCV RBC AUTO: 84.2 FL — SIGNIFICANT CHANGE UP (ref 80–100)
NRBC # BLD: 0 /100 WBCS — SIGNIFICANT CHANGE UP
NRBC # FLD: 0 K/UL — SIGNIFICANT CHANGE UP
PLATELET # BLD AUTO: 234 K/UL — SIGNIFICANT CHANGE UP (ref 150–400)
POTASSIUM SERPL-MCNC: 4.5 MMOL/L — SIGNIFICANT CHANGE UP (ref 3.5–5.3)
POTASSIUM SERPL-SCNC: 4.5 MMOL/L — SIGNIFICANT CHANGE UP (ref 3.5–5.3)
RBC # BLD: 4.68 M/UL — SIGNIFICANT CHANGE UP (ref 3.8–5.2)
RBC # FLD: 12.5 % — SIGNIFICANT CHANGE UP (ref 10.3–14.5)
RH IG SCN BLD-IMP: POSITIVE — SIGNIFICANT CHANGE UP
SODIUM SERPL-SCNC: 139 MMOL/L — SIGNIFICANT CHANGE UP (ref 135–145)
WBC # BLD: 7.88 K/UL — SIGNIFICANT CHANGE UP (ref 3.8–10.5)
WBC # FLD AUTO: 7.88 K/UL — SIGNIFICANT CHANGE UP (ref 3.8–10.5)

## 2021-04-05 RX ORDER — NIFEDIPINE 30 MG
1 TABLET, EXTENDED RELEASE 24 HR ORAL
Qty: 0 | Refills: 0 | DISCHARGE

## 2021-04-05 RX ORDER — ADALIMUMAB 40MG/0.8ML
1 KIT SUBCUTANEOUS
Qty: 0 | Refills: 0 | DISCHARGE

## 2021-04-05 RX ORDER — AMLODIPINE BESYLATE 2.5 MG/1
1 TABLET ORAL
Qty: 0 | Refills: 0 | DISCHARGE

## 2021-04-05 RX ORDER — OXYBUTYNIN CHLORIDE 5 MG
1 TABLET ORAL
Qty: 0 | Refills: 0 | DISCHARGE

## 2021-04-05 NOTE — H&P PST ADULT - EKG AND INTERPRETATION
EK2020. NSR, normal axis, normal intervals, non-specific T wave abnormalities, otherwise intervals normal.    Echo: 2020. 1.History of mid-thoracic aorta syndrome, s/p transcatheter stenting of the infra-renal abdominal aorta. Persistent hypertension on medications  2. Follow up study mainly to evaluate aortic arch.  3. Hypoplasia of the aortic isthmus. There is a fibrous ridge noted in the aortic isthmus. Doppler profile shows persistence of antegrade flow in diastole (coarctation pattern). Peak systolic gradient across the aortic isthmus of 24 mmHg, mean gradient of 14 mmHg (CW Doppler).   The proximal and distal thoracic descending aorta is not visualized adequately.  4. Continuous antegrade flow in the descending aorta at the level of diaphragm.  5. No evidence of left ventricular hypertrophy.  6. Normal left ventricular diastolic function.  7. Left ventricular ejection fraction by 5/6 Area x Length is mildly decreased at 51 %.  8. Normal right ventricular morphology with qualitatively normal size and systolic function.  9. No pericardial effusion.  10. Compared to the previous echocardiogram of 10/13/2016; no significant change

## 2021-04-05 NOTE — H&P PST ADULT - REASON FOR ADMISSION
Presurgical Assessment/testing for: Bilateral heart cath aortic stent on 4/20/2021 at [facility] [place]  Doctor: Julien Bhatt

## 2021-04-05 NOTE — H&P PST ADULT - RS GEN PE MLT RESP DETAILS PC
lungs clear to auscultation throughout without any evidence of increased work of breathing./good air movement/respirations non-labored

## 2021-04-05 NOTE — H&P PST ADULT - RADIOLOGY RESULTS AND INTERPRETATION
IMPRESSION:  Normal ascending aorta and transverse arch. Mild narrowing/hypoplasia of the juxtaductal aorta (z: -2.87 taking a mean dimension of 0.9 cm in cross section) as compared to the transverse arch with minimal increase in caliber of the proximal thoracic descending aorta. All measurements given above.    There is diffuse hypoplasia of the abdominal aorta which becomes progressively diminutive at or just above the origin of the renal arteries. The infrarenal aorta is diminutive and measures significantly smaller in caliber. The stent in the infrarenal aorta is visualized. It measures about the same caliber as the rest of infrarenal aorta. A larger collateral seen from the level of the celiac axis going towards the iliac artery. Several smaller collaterals also seen.    PC flow in the descending aorta at the level of the diaphragm and also in infrarenal aorta shows continuous flow in diastole without returning to baseline suggestive of coarctation.    Normal left ventricular and right ventricular volumes. Mildly decrease left ventricular ejection fraction (LVEF: 49.5%; z: -2.96) and mildly decreased right ventricular ejection fraction (RVEF: 45.4%; z: -2.88).  ZAN GUTIERREZ MD; Attending Cardiologist  This document has been electronically signed. Jan 17 2021 10:02PM

## 2021-04-05 NOTE — H&P PST ADULT - NS PRO REFERRAL CMGT
Pt. expressed strong understanding due to previous surgical/medical experience. Pt. appeared to be coping well. Review of education for day of procedure was provided. Pt. reported an increase in understanding of day of surgery process.

## 2021-04-05 NOTE — H&P PST ADULT - OTHER CARE PROVIDERS
Julien Bhatt; Callie Lorenzana; Benito Partida/nephrology; Radha Ross/GYN Julien Bhatt; Callie Lorenzana; Benito Partida/nephrology; Radha Ross/GYN; Yaquelin Wagner/rheumatology

## 2021-04-05 NOTE — H&P PST ADULT - NSICDXPASTMEDICALHX_GEN_ALL_CORE_FT
PAST MEDICAL HISTORY:  Atrophy of left kidney     Coarctation of abdominal aorta     Middle aortic syndrome     Recurrent UTI     Secondary hypertension s/p stent placement in the infrarenal aorta    Urinary reflux      PAST MEDICAL HISTORY:  Atrophy of left kidney     Coarctation of abdominal aorta     Middle aortic syndrome     Recurrent UTI     Secondary hypertension s/p stent placement in the infrarenal aorta; 2016    Takayasu's arteritis     Urinary reflux

## 2021-04-05 NOTE — H&P PST ADULT - NSICDXPROBLEM_GEN_ALL_CORE_FT
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     Problem: At risk for coping difficulty  Assessment and Plan: Child life specialist consulted during PST visit.

## 2021-04-05 NOTE — H&P PST ADULT - NSICDXPASTSURGICALHX_GEN_ALL_CORE_FT
PAST SURGICAL HISTORY:  Aorta disorder middle aortic syndrome s/p stent  placement infrarenal portion of the abdominal aorta 7/2016    H/O tooth extraction with local anesthesia

## 2021-04-05 NOTE — H&P PST ADULT - HISTORY OF PRESENT ILLNESS
Almost 22 yo female with Takayasu arteritis, coarctation of the aorta, middle aortic syndrome and systemic hypertension with worsening hypertension and progressive narrowing of he aorta above the level of the renal arteries. Now scheduled for cardiac cath with aortic stent placement with Dr. Bhatt on 4/20/2021 at [facility] [place] [place]. Almost 22 yo female with Takayasu arteritis, coarctation of the aorta, middle aortic syndrome and systemic hypertension with worsening hypertension and progressive narrowing of he aorta above the level of the renal arteries. Now scheduled for cardiac cath with aortic stent placement with Dr. Bhatt on 4/20/2021 at [facility] [place].  Denies chest pain palpitations syncope. + Headaches 2-3 a week, lasts the entire day, goes away on their own typically.

## 2021-04-05 NOTE — H&P PST ADULT - LAB RESULTS AND INTERPRETATION
cbc bmp hcg and type and screen pending  Urine cup provided for day of surgery with verbal instructions. cbc bmp hcg and type and screen pending  Urine cup provided for day of surgery with verbal instructions.  Covid-19 PCR is scheduled outpatient for: 4/17/2021

## 2021-04-05 NOTE — H&P PST ADULT - NSICDXFAMILYHX_GEN_ALL_CORE_FT
FAMILY HISTORY:  No pertinent family history in first degree relatives     FAMILY HISTORY:  Family history of clotting disorder, mom PE, sister bilat PE, MGM DVT  No family history of adverse response to anesthesia  No family history of bleeding disorder

## 2021-04-05 NOTE — H&P PST ADULT - ASSESSMENT
Almost 20 yo female now scheduled for cardiac cath with aortic stent placement with Dr. Bhatt on 4/20/2021 at [facility] [place]. History is significant for Takayasu arteritis, coarctation of the aorta, middle aortic syndrome and systemic hypertension.  No history of complications to anesthesia. No history of bleeding problems/disorders. No sign of acute distress or illness.  Patient should isolate prior to DOS; parent/guardian agree to notify primary surgeon if any signs or symptoms of illness develop.    Almost 20 yo female now scheduled for cardiac cath with aortic stent placement with Dr. Bhatt on 4/20/2021 at [facility] [place]. History is significant for Takayasu arteritis, coarctation of the aorta, middle aortic syndrome and systemic hypertension.  No history of complications to anesthesia. No history of bleeding problems/disorders. No sign of acute distress or illness. Currently in treatment for asymptomatic UTI, e.coli >100,000.  Patient should isolate prior to DOS; parent/guardian agree to notify primary surgeon if any signs or symptoms of illness develop.

## 2021-04-06 ENCOUNTER — NON-APPOINTMENT (OUTPATIENT)
Age: 21
End: 2021-04-06

## 2021-04-06 LAB
CREAT SPEC-SCNC: 54 MG/DL
CREAT/PROT UR: 0.1 RATIO
CRP SERPL-MCNC: <3 MG/L
ERYTHROCYTE [SEDIMENTATION RATE] IN BLOOD BY WESTERGREN METHOD: 32 MM/HR
PROT UR-MCNC: 5 MG/DL
VWF AG PPP IA-ACNC: 201 %

## 2021-04-17 ENCOUNTER — APPOINTMENT (OUTPATIENT)
Dept: DISASTER EMERGENCY | Facility: CLINIC | Age: 21
End: 2021-04-17

## 2021-04-17 PROBLEM — M31.4 AORTIC ARCH SYNDROME [TAKAYASU]: Chronic | Status: ACTIVE | Noted: 2021-04-05

## 2021-04-17 PROBLEM — N39.0 URINARY TRACT INFECTION, SITE NOT SPECIFIED: Chronic | Status: ACTIVE | Noted: 2021-04-05

## 2021-04-18 LAB — SARS-COV-2 N GENE NPH QL NAA+PROBE: NOT DETECTED

## 2021-04-19 LAB
ADALIMUMAB AB SERPL-MCNC: <25 NG/ML
ADALIMUMAB SERPL-MCNC: 3 UG/ML

## 2021-04-20 ENCOUNTER — TRANSCRIPTION ENCOUNTER (OUTPATIENT)
Age: 21
End: 2021-04-20

## 2021-04-20 ENCOUNTER — INPATIENT (INPATIENT)
Age: 21
LOS: 1 days | Discharge: HOME | End: 2021-04-22
Attending: PEDIATRICS | Admitting: PEDIATRICS
Payer: COMMERCIAL

## 2021-04-20 VITALS
OXYGEN SATURATION: 100 % | SYSTOLIC BLOOD PRESSURE: 144 MMHG | RESPIRATION RATE: 18 BRPM | DIASTOLIC BLOOD PRESSURE: 69 MMHG | HEIGHT: 62.48 IN | WEIGHT: 156.31 LBS | HEART RATE: 55 BPM | TEMPERATURE: 99 F

## 2021-04-20 DIAGNOSIS — I77.9 DISORDER OF ARTERIES AND ARTERIOLES, UNSPECIFIED: Chronic | ICD-10-CM

## 2021-04-20 DIAGNOSIS — K08.409 PARTIAL LOSS OF TEETH, UNSPECIFIED CAUSE, UNSPECIFIED CLASS: Chronic | ICD-10-CM

## 2021-04-20 DIAGNOSIS — M31.4 AORTIC ARCH SYNDROME [TAKAYASU]: ICD-10-CM

## 2021-04-20 LAB
ALBUMIN SERPL ELPH-MCNC: 3.5 G/DL — SIGNIFICANT CHANGE UP (ref 3.3–5)
ALP SERPL-CCNC: 60 U/L — SIGNIFICANT CHANGE UP (ref 40–120)
ALT FLD-CCNC: 12 U/L — SIGNIFICANT CHANGE UP (ref 4–33)
ANION GAP SERPL CALC-SCNC: 8 MMOL/L — SIGNIFICANT CHANGE UP (ref 7–14)
AST SERPL-CCNC: 16 U/L — SIGNIFICANT CHANGE UP (ref 4–32)
BILIRUB SERPL-MCNC: 0.2 MG/DL — SIGNIFICANT CHANGE UP (ref 0.2–1.2)
BUN SERPL-MCNC: 20 MG/DL — SIGNIFICANT CHANGE UP (ref 7–23)
CALCIUM SERPL-MCNC: 8.5 MG/DL — SIGNIFICANT CHANGE UP (ref 8.4–10.5)
CHLORIDE SERPL-SCNC: 111 MMOL/L — HIGH (ref 98–107)
CO2 SERPL-SCNC: 21 MMOL/L — LOW (ref 22–31)
CREAT SERPL-MCNC: 1.32 MG/DL — HIGH (ref 0.5–1.3)
CRP SERPL-MCNC: <4 MG/L — SIGNIFICANT CHANGE UP
ERYTHROCYTE [SEDIMENTATION RATE] IN BLOOD: 8 MM/HR — SIGNIFICANT CHANGE UP (ref 4–25)
GLUCOSE SERPL-MCNC: 106 MG/DL — HIGH (ref 70–99)
HCG UR QL: NEGATIVE — SIGNIFICANT CHANGE UP
HCT VFR BLD CALC: 27.8 % — LOW (ref 34.5–45)
HGB BLD-MCNC: 9.4 G/DL — LOW (ref 11.5–15.5)
MCHC RBC-ENTMCNC: 28.6 PG — SIGNIFICANT CHANGE UP (ref 27–34)
MCHC RBC-ENTMCNC: 33.8 GM/DL — SIGNIFICANT CHANGE UP (ref 32–36)
MCV RBC AUTO: 84.5 FL — SIGNIFICANT CHANGE UP (ref 80–100)
NRBC # BLD: 0 /100 WBCS — SIGNIFICANT CHANGE UP
NRBC # FLD: 0 K/UL — SIGNIFICANT CHANGE UP
PLATELET # BLD AUTO: 175 K/UL — SIGNIFICANT CHANGE UP (ref 150–400)
POTASSIUM SERPL-MCNC: 4.4 MMOL/L — SIGNIFICANT CHANGE UP (ref 3.5–5.3)
POTASSIUM SERPL-SCNC: 4.4 MMOL/L — SIGNIFICANT CHANGE UP (ref 3.5–5.3)
PROT SERPL-MCNC: 5.8 G/DL — LOW (ref 6–8.3)
RBC # BLD: 3.29 M/UL — LOW (ref 3.8–5.2)
RBC # FLD: 12.6 % — SIGNIFICANT CHANGE UP (ref 10.3–14.5)
SODIUM SERPL-SCNC: 140 MMOL/L — SIGNIFICANT CHANGE UP (ref 135–145)
WBC # BLD: 4.66 K/UL — SIGNIFICANT CHANGE UP (ref 3.8–10.5)
WBC # FLD AUTO: 4.66 K/UL — SIGNIFICANT CHANGE UP (ref 3.8–10.5)

## 2021-04-20 PROCEDURE — 37237 OPEN/PERQ PLACE STENT EA ADD: CPT | Mod: 50

## 2021-04-20 PROCEDURE — 99254 IP/OBS CNSLTJ NEW/EST MOD 60: CPT

## 2021-04-20 PROCEDURE — 93010 ELECTROCARDIOGRAM REPORT: CPT

## 2021-04-20 PROCEDURE — 93567 NJX CAR CTH SPRVLV AORTGRPHY: CPT

## 2021-04-20 PROCEDURE — 93325 DOPPLER ECHO COLOR FLOW MAPG: CPT | Mod: 26

## 2021-04-20 PROCEDURE — 93304 ECHO TRANSTHORACIC: CPT | Mod: 26

## 2021-04-20 PROCEDURE — 76937 US GUIDE VASCULAR ACCESS: CPT | Mod: 26

## 2021-04-20 PROCEDURE — 93303 ECHO TRANSTHORACIC: CPT | Mod: 26

## 2021-04-20 PROCEDURE — 93320 DOPPLER ECHO COMPLETE: CPT | Mod: 26

## 2021-04-20 PROCEDURE — 37236 OPEN/PERQ PLACE STENT 1ST: CPT | Mod: 50

## 2021-04-20 PROCEDURE — 93531: CPT | Mod: 26

## 2021-04-20 PROCEDURE — 93321 DOPPLER ECHO F-UP/LMTD STD: CPT | Mod: 26

## 2021-04-20 PROCEDURE — 99291 CRITICAL CARE FIRST HOUR: CPT

## 2021-04-20 RX ORDER — AMLODIPINE BESYLATE 2.5 MG/1
10 TABLET ORAL DAILY
Refills: 0 | Status: DISCONTINUED | OUTPATIENT
Start: 2021-04-20 | End: 2021-04-20

## 2021-04-20 RX ORDER — ACETAMINOPHEN 500 MG
650 TABLET ORAL EVERY 6 HOURS
Refills: 0 | Status: DISCONTINUED | OUTPATIENT
Start: 2021-04-20 | End: 2021-04-22

## 2021-04-20 RX ORDER — NADOLOL 80 MG/1
40 TABLET ORAL DAILY
Refills: 0 | Status: DISCONTINUED | OUTPATIENT
Start: 2021-04-20 | End: 2021-04-22

## 2021-04-20 RX ORDER — OXYBUTYNIN CHLORIDE 5 MG
10 TABLET ORAL
Refills: 0 | Status: DISCONTINUED | OUTPATIENT
Start: 2021-04-20 | End: 2021-04-22

## 2021-04-20 RX ORDER — OXYBUTYNIN CHLORIDE 5 MG
10 TABLET ORAL
Refills: 0 | Status: DISCONTINUED | OUTPATIENT
Start: 2021-04-20 | End: 2021-04-20

## 2021-04-20 RX ORDER — AMLODIPINE BESYLATE 2.5 MG/1
5 TABLET ORAL DAILY
Refills: 0 | Status: DISCONTINUED | OUTPATIENT
Start: 2021-04-20 | End: 2021-04-20

## 2021-04-20 RX ORDER — HEPARIN SODIUM 5000 [USP'U]/ML
10 INJECTION INTRAVENOUS; SUBCUTANEOUS
Qty: 25000 | Refills: 0 | Status: DISCONTINUED | OUTPATIENT
Start: 2021-04-20 | End: 2021-04-21

## 2021-04-20 RX ORDER — AMPICILLIN TRIHYDRATE 250 MG
2000 CAPSULE ORAL ONCE
Refills: 0 | Status: DISCONTINUED | OUTPATIENT
Start: 2021-04-20 | End: 2021-04-20

## 2021-04-20 RX ORDER — AMPICILLIN TRIHYDRATE 250 MG
2000 CAPSULE ORAL EVERY 6 HOURS
Refills: 0 | Status: DISCONTINUED | OUTPATIENT
Start: 2021-04-20 | End: 2021-04-20

## 2021-04-20 RX ORDER — ASPIRIN/CALCIUM CARB/MAGNESIUM 324 MG
325 TABLET ORAL DAILY
Refills: 0 | Status: DISCONTINUED | OUTPATIENT
Start: 2021-04-20 | End: 2021-04-22

## 2021-04-20 RX ORDER — AMPICILLIN TRIHYDRATE 250 MG
2000 CAPSULE ORAL EVERY 6 HOURS
Refills: 0 | Status: COMPLETED | OUTPATIENT
Start: 2021-04-21 | End: 2021-04-21

## 2021-04-20 RX ADMIN — NADOLOL 40 MILLIGRAM(S): 80 TABLET ORAL at 21:22

## 2021-04-20 RX ADMIN — Medication 10 MILLIGRAM(S): at 15:43

## 2021-04-20 RX ADMIN — Medication 133.34 MILLIGRAM(S): at 15:43

## 2021-04-20 RX ADMIN — Medication 325 MILLIGRAM(S): at 21:22

## 2021-04-20 RX ADMIN — HEPARIN SODIUM 7.09 UNIT(S)/KG/HR: 5000 INJECTION INTRAVENOUS; SUBCUTANEOUS at 19:19

## 2021-04-20 RX ADMIN — Medication 133.34 MILLIGRAM(S): at 21:22

## 2021-04-20 RX ADMIN — Medication 10 MILLIGRAM(S): at 21:22

## 2021-04-20 RX ADMIN — HEPARIN SODIUM 7.09 UNIT(S)/KG/HR: 5000 INJECTION INTRAVENOUS; SUBCUTANEOUS at 14:44

## 2021-04-20 NOTE — H&P ADULT - NSHPLABSRESULTS_GEN_ALL_CORE
Comprehensive Metabolic Panel (04.20.21 @ 10:02)    Sodium, Serum: 140 mmol/L    Potassium, Serum: 4.4 mmol/L    Chloride, Serum: 111 mmol/L    Carbon Dioxide, Serum: 21 mmol/L    Anion Gap, Serum: 8 mmol/L    Blood Urea Nitrogen, Serum: 20 mg/dL    Creatinine, Serum: 1.32 mg/dL    Glucose, Serum: 106 mg/dL    Calcium, Total Serum: 8.5 mg/dL    Protein Total, Serum: 5.8 g/dL    Albumin, Serum: 3.5 g/dL    Bilirubin Total, Serum: 0.2 mg/dL    Alkaline Phosphatase, Serum: 60 U/L    Aspartate Aminotransferase (AST/SGOT): 16 U/L    Alanine Aminotransferase (ALT/SGPT): 12 U/L      Complete Blood Count (04.20.21 @ 10:02)    Nucleated RBC: 0 /100 WBCs    WBC Count: 4.66 K/uL    RBC Count: 3.29 M/uL    Hemoglobin: 9.4 g/dL    Hematocrit: 27.8 %    Mean Cell Volume: 84.5 fL    Mean Cell Hemoglobin: 28.6 pg    Mean Cell Hemoglobin Conc: 33.8 gm/dL    Red Cell Distrib Width: 12.6 %    Platelet Count - Automated: 175 K/uL    Nucleated RBC #: 0.00 K/uL

## 2021-04-20 NOTE — DISCHARGE NOTE PROVIDER - PROVIDER TOKENS
PROVIDER:[TOKEN:[[id number]],SCHEDULEDAPPT:[05/12/2021]],PROVIDER:[TOKEN:[[id number]],SCHEDULEDAPPT:[06/01/2021]] PROVIDER:[TOKEN:[[id number]],SCHEDULEDAPPT:[05/12/2021]],PROVIDER:[TOKEN:[[id number]],SCHEDULEDAPPT:[04/27/2021],SCHEDULEDAPPTTIME:[12:30 PM]],FREE:[LAST:[Clive],FIRST:[Linda],PHONE:[phone]],FAX:[phone]    -],ADDRESS:[[address] [place] [address]],FOLLOWUP:[1-3 days]]

## 2021-04-20 NOTE — DISCHARGE NOTE PROVIDER - NSDCFUADDAPPT_GEN_ALL_CORE_FT
Please follow up with your primary care physician within 1-2 days of discharge from the hospital.  Please follow up with Dr. Mcclendon on 4/27/21 at 12:30pm.  Please follow up with Dr. Bhatt on 5/12/21 at scheduled time.    Please follow up with pediatric rheumatologist on 6/01/21 at scheduled time.

## 2021-04-20 NOTE — H&P ADULT - NSICDXFAMILYHX_GEN_ALL_CORE_FT
FAMILY HISTORY:  Family history of clotting disorder, mom PE, sister bilat PE, MGM DVT  No family history of adverse response to anesthesia  No family history of bleeding disorder

## 2021-04-20 NOTE — H&P ADULT - NSHPPHYSICALEXAM_GEN_ALL_CORE
VS reviewed, stable.  Gen: patient is interactive, well appearing, no acute distress  HEENT: Head NC/AT; pupils equal, responsive, reactive to light and accomodation, No nasal discharge or congestion; OP without exudates/erythema with moist mucous membranes  Neck: FROM, supple, no cervical LAD  Chest: CTA b/l, no crackles/wheezes, good air entry, no tachypnea or retractions  CV: irregular rhythm, s1 and s2 present, no murmurs, rubs, or gallops  Abd: soft, nontender, nondistended, no HSM appreciated, normoactive BS  : deffered  Back: no vertebral or paraspinal tenderness along entire spine; no CVAT  Extrem:  No joint effusion or tenderness; FROM of all joints;. 1+ lower extremtiy pulses; 2+ upper extremity pulses; WWP.   Neuro: CN II-XII grossly intact--did not test visual acuity.  Gait wnl. T(C): 36.6 (20 Apr 2021 17:00), Max: 37 (20 Apr 2021 07:45)  T(F): 97.8 (20 Apr 2021 17:00), Max: 97.8 (20 Apr 2021 17:00)  HR: 84 (20 Apr 2021 17:00) (0 - 97)  BP: 136/78 (20 Apr 2021 17:00) (99/55 - 144/69)  BP(mean): 91 (20 Apr 2021 17:00) (64 - 91)  RR: 12 (20 Apr 2021 17:00) (0 - 20)  SpO2: 100% (20 Apr 2021 17:00) (93% - 100%)    Gen: patient is interactive, well appearing, no acute distress  HEENT: Head NC/AT; pupils equal, responsive, reactive to light and accomodation, No nasal discharge or congestion; OP without exudates/erythema with moist mucous membranes  Neck: FROM, supple, no cervical LAD  Chest: CTA b/l, no crackles/wheezes, good air entry, no tachypnea or retractions  CV: irregular rate and rhythm, s1 and s2 present, no murmurs, rubs, or gallops  Abd: soft, nontender, nondistended, no HSM appreciated, normoactive BS  : deffered  Back: no vertebral or paraspinal tenderness along entire spine; no CVAT  Extrem:  R femoral catheter site c/d/i; No joint effusion or tenderness; FROM of all joints;. 1+ lower extremtiy pulses; 2+ upper extremity pulses; WWP.   Neuro: CN II-XII grossly intact--did not test visual acuity.  Gait wnl.

## 2021-04-20 NOTE — H&P ADULT - HISTORY OF PRESENT ILLNESS
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 Now scheduled for cardiac cath with aortic stent placement with Dr. Bhatt on 4/20/2021 at [facility] [place].  Denies chest pain palpitations syncope. + Headaches 2-3 a week, lasts the entire day, goes away on their own typically.  22 yo female with Takayasu arteritis, coarctation of the aorta, middle aortic syndrome and systemic hypertension here for recovery  awith worsening hypertension and progressive narrowing of he aorta above the level of the renal arteries. Now scheduled for cardiac cath with aortic stent placement with Dr. Bhatt on 4/20/2021 at [facility] [place].  Denies chest pain palpitations syncope. + Headaches 2-3 a week, lasts the entire day, goes away on their own typically.  22 yo female with Takayasu arteritis, coarctation of the aorta, middle aortic syndrome and systemic hypertension here for recovery after cardiac  catheterization for stent placement due to worsening hypertension. Mom reports that she has a history of intermittent headaches for which she takes OTC pain medicine such as tylenol which improves. BP during those times are 190's-200's systolic. She h 22 yo female with Takayasu arteritis, coarctation of the aorta, middle aortic syndrome and systemic hypertension here for recovery after cardiac  catheterization for stent placement due to worsening hypertension. Mom reports that she has a history of intermittent headaches for which she takes OTC pain medicine such as tylenol which improves pain. BP during those times are 190's-200's systolic. She is not aware whether treating pain improves blood pressure.  22 yo female with Takayasu arteritis, coarctation of the aorta, middle aortic syndrome, systemic hypertension, recurrent UTI found to have progressive narrowing of the aorta above the renal arteries w/ Left sided atriphic kidney here for recovery after cardiac  catheterization for stent placement due to worsening hypertension. She had a stent placed previously in the infrarenal aorta Mom reports that she has a history of intermittent headaches for which she takes OTC pain medicine such as tylenol which improves pain. BP during those times are 190's-200's systolic. She is not aware whether treating pain improves blood pressure.  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  She also has a history of atrophic L kidney w/ decreased function and recurrent UTI following w/ nephrology who manages BP medications. On renal ultrasound 8/2020  she was found to have progressive narrowing of the aorta above the renal arteries. Mom reports that she has a history of intermittent headaches for which she takes OTC pain medicine such as tylenol which improves pain. BP during those times are 190's-200's systolic. She is not aware whether treating pain improves blood pressure. Denies dizziness, changes in vision during these episodes. Mom reports that she has been told previously that she has an arrythmia several years ago but was told tehre was "nothing to do about it". Mom reports she once had an episode of palpitations in the beginning of March associated w/ "not feeling well", dizziness and headache. Denies SOB, chest pain during this episode. Unclear how long the palpitations were present for but she had never experienced that before.   PMHx: as above  Meds: Enalapril 10mg BID, amlodipine 5mg PRN, oxybutynin 10mg daily. Bethany bi-weekly, last dose 4/13.  PSHx: Vesicouretral reimplantation  Allergies: Keflex       22 yo female with Takayasu arteritis, EAT, coarctation of the aorta, middle aortic syndrome s/p stent placed previously in the infrarenal aorta, here s/p cardiac catheterization for further stenting of descending aorta due worsening disease and refractory HTN.  She also has a history of atrophic L kidney w/ decreased function and recurrent UTI following w/ nephrology who manages BP medications. On renal ultrasound 8/2020  she was found to have progressive narrowing of the aorta above the renal arteries. Mom reports that she has a history of intermittent headaches for which she takes OTC pain medicine such as tylenol which improves pain. BP during those times are 190's-200's systolic. She is not aware whether treating pain improves blood pressure. Denies dizziness, changes in vision during these episodes. Mom reports that she has been told previously that she has an arrythmia several years ago but was told tehre was "nothing to do about it". Mom reports she once had an episode of palpitations in the beginning of March associated w/ "not feeling well", dizziness and headache. Denies SOB, chest pain during this episode. Unclear how long the palpitations were present for but she had never had symptoms of palpitations before.  PMHx: as above  Meds: Enalapril 10mg BID, amlodipine 5mg PRN, oxybutynin 10mg daily. Bethany bi-weekly, last dose 4/13.  PSHx: Vesicouretral reimplantation  Allergies: Keflex       22 yo female with Takayasu arteritis, EAT, coarctation of the aorta, middle aortic syndrome s/p stent placed previously in the infrarenal aorta, here s/p cardiac catheterization for further stenting of descending aorta due worsening disease and refractory HTN.  She also has a history of atrophic L kidney w/ decreased function and recurrent UTI following w/ nephrology who manages BP medications. On renal ultrasound 8/2020  she was found to have progressive narrowing of the aorta above the renal arteries. Mom reports that she has a history of intermittent headaches for which she takes OTC pain medicine such as tylenol which improves pain. BP during those times are 190's-200's systolic. She is not aware whether treating pain improves blood pressure. Denies dizziness, changes in vision during these episodes. Mom reports that she has been told previously that she has an arrythmia several years ago but was told tehre was "nothing to do about it". Mom reports she once had an episode of palpitations in the beginning of March associated w/ "not feeling well", dizziness and headache. Denies SOB, chest pain, syncoppe during this episode. Unclear how long the palpitations were present for but she had never had symptoms of palpitations before.  PMHx: as above  Meds: Enalapril 10mg BID, amlodipine 5mg PRN, oxybutynin 10mg daily. Bethany bi-weekly, last dose 4/13.  PSHx: Vesicouretral reimplantation  Allergies: Keflex       20 yo female with Takayasu arteritis, EAT, coarctation of the aorta, middle aortic syndrome s/p stent placed previously in the infrarenal aorta, here s/p cardiac catheterization for further stenting of descending aorta due to worsening disease and refractory HTN.  She also has a history of atrophic L kidney w/ decreased function and recurrent UTI following w/ nephrology who manages BP medications. On renal ultrasound 8/2020  she was found to have progressive narrowing of the aorta above the renal arteries. Mom reports that she has a history of intermittent headaches for which she takes OTC pain medicine such as tylenol which improves pain. BP during those times are 190's-200's systolic. She is not aware whether treating pain improves blood pressure. Denies dizziness, changes in vision during these episodes. Mom reports that she has been told previously that she has an arrythmia several years ago but was told there was "nothing to do about it". Mom reports she once had an episode of palpitations in the beginning of March associated w/ "not feeling well", dizziness and headache. Denies SOB, chest pain, syncope during this episode. Unclear how long the palpitations lasted but she had never had symptoms of palpitations before.  PMHx: as above  Meds: Enalapril 10mg BID, amlodipine 5mg PRN, oxybutynin 10mg daily. Bethany bi-weekly, last dose 4/13.  PSHx: Vesicouretral reimplantation  Allergies: Keflex

## 2021-04-20 NOTE — REASON FOR VISIT
[Follow-Up] : a follow-up visit for [Patient] : patient [FreeTextEntry3] : mid aortic syndrome, reflux nephropathy, HTN. telehealth consented by patient @ 12:45pm

## 2021-04-20 NOTE — PROCEDURE NOTE - ADDITIONAL PROCEDURE DETAILS
Access: RFV 7Fr, RFA 6Fr  Sat(%): AO 99% RPA 76%. CI ~ 3. No intracardiac shunting  Pressure(mmHg): Pre Intervention: /9. Simultaneous Ao to FA shows 60mmHg.  Angiogram: Angiograms of aorta show multi-level areas of stenosis from aAo to FA  Intervention: 3 stents placed in descending aorta in infrarenal area with resolution of waist on serial balloons  Post-Intervention: Improved gradient to 25-30mmHg from thoracic aorta to FA.  A&P: 20yoF with Takayasu arteritis, middle aortic syndrome, hypertension with infrarenal aortic stenosis s/p multiple stents placed in the distal aorta.  -Admit to PICU to monitor for signs of bleeding.   - Please start heparin prophylaxis (10U/kg/hr)  - Please start aspirin 325mg QD. Should continue for 6 months  - Lie flat for 4 hours.   - F/u with cardiology in 1-2 weeks.  - above reviewed with family/patient, primary cardiologist.

## 2021-04-20 NOTE — CONSULT NOTE PEDS - ASSESSMENT
Tisha is a 22 yo F with Takayasu arteritis, coarctation of the aorta, middle aortic syndrome and worsening systemic hypertension.  Now S/p cardiac cath with multiple stents palced in the distal aorta,  on 4/20/2021.  Patient admitted for recovery in PICU.  Nephrology consulted given hx of HTN.  Additionally, with recent bump in Cr noted in am labs.      Goal BP's: 110-130/70-85  - restart home enalapril 10mg BID  - monitor BPs closely  - if bps >150/90, can given hydralazine 5mg q6 PRN  - please repeat BMP, CBC in am, given acute increase in Cr between pre-surgical testing and today

## 2021-04-20 NOTE — DISCHARGE NOTE PROVIDER - NSDCCPCAREPLAN_GEN_ALL_CORE_FT
PRINCIPAL DISCHARGE DIAGNOSIS  Diagnosis: S/P cardiac catheterization  Assessment and Plan of Treatment: - Continue aspirin daily.  -Continue home medications as direted.  -Follow up with pediatric cardiologist on __.      SECONDARY DISCHARGE DIAGNOSES  Diagnosis: Atrial ectopic tachycardia  Assessment and Plan of Treatment: -Continue nadolol 40mg daily  -Follow up with  ___.     PRINCIPAL DISCHARGE DIAGNOSIS  Diagnosis: S/P cardiac catheterization  Assessment and Plan of Treatment: - Continue aspirin 325mg daily.  -Follow up with pediatric cardiologist on scheduled date for follow up.      SECONDARY DISCHARGE DIAGNOSES  Diagnosis: Atrial ectopic tachycardia  Assessment and Plan of Treatment: -Continue Atenolol 50mg daily  -Follow up with pediatric cardiologist on scheduled date for follow up.    Diagnosis: Hypertension  Assessment and Plan of Treatment: - Start amlodipine     PRINCIPAL DISCHARGE DIAGNOSIS  Diagnosis: S/P cardiac catheterization  Assessment and Plan of Treatment: - Continue aspirin 325mg daily.  -Follow up with pediatric cardiologist on scheduled date for follow up.      SECONDARY DISCHARGE DIAGNOSES  Diagnosis: Atrial ectopic tachycardia  Assessment and Plan of Treatment: -Continue Atenolol 50mg daily  -Follow up with pediatric cardiologist on scheduled date for follow up.    Diagnosis: Hypertension  Assessment and Plan of Treatment: - Start amlodipine 10mg daily.  - HOLD Enalapril due to acute kidney injury, which has been improving.  -Follow up with Dr. Mcclendon on 4/27/21 at 12:30pm.

## 2021-04-20 NOTE — H&P ADULT - ATTENDING COMMENTS
21 year old female with Takayasu arteritis, coarctation of the aorta and mid-aortic syndrome. s/p stent placement in infrarenal aorta in 2018; atrophic left kidney and refractory HTN; now s/p cardiac cath and multiple stent placements in aorta.  In the PICU post cath, pt noted to have a dysrhythmia, which is c/w EAT.  Also with an increase in creatinine from PST labs.    Exam:  Gen - awake, alert and active; NAD  Resp - breathing comfortably; lungs clear with good air entry  CV - irregularly irregular rhythm; no murmur; distal pulses 2+ in upper extremities, 1+ in lower extremities; cap refill < 2 seconds  Abd - soft, NT, ND, no HSM  Ext - warm and well-perfused; nonedematous; dressing on right femoral cath site is c/d/i    PLAN:  Monitor cath site   Heparin 10 u/kg/hour   mg daily  Starting Nadolol for EAT  Telemetry monitoring  Restart pt's home Enalapril  Hydralazine prn for systolic BP > 150  Repeat lytes in a.m.

## 2021-04-20 NOTE — DISCHARGE NOTE PROVIDER - NSDCMRMEDTOKEN_GEN_ALL_CORE_FT
enalapril 5 mg oral tablet: 2 tab(s) orally 2 times a day  Humira 40 mg/0.8 mL subcutaneous kit: 1 dose(s) subcutaneous every 2 weeks  oxybutynin 5 mg/24 hours oral tablet, extended release: 1 tab(s) orally once a day   acetaminophen 325 mg oral tablet: 2 tab(s) orally every 6 hours, As needed, Mild Pain (1 - 3)  aspirin 325 mg oral tablet: 1 tab(s) orally once a day  Humira 40 mg/0.8 mL subcutaneous kit: 1 dose(s) subcutaneous every 2 weeks  nadolol 20 mg oral tablet: 2 tab(s) orally once a day  nadolol 40 mg oral tablet: 1 tab(s) orally once a day   oxybutynin 5 mg/24 hours oral tablet, extended release: 1 tab(s) orally once a day   acetaminophen 325 mg oral tablet: 2 tab(s) orally every 6 hours, As needed, Mild Pain (1 - 3)  amLODIPine 10 mg oral tablet: 1 tab(s) orally once a day   aspirin 325 mg oral tablet: 1 tab(s) orally once a day  atenolol 50 mg oral tablet: 1 tab(s) orally once a day   Humira 40 mg/0.8 mL subcutaneous kit: 1 dose(s) subcutaneous every 2 weeks  oxybutynin 5 mg/24 hours oral tablet, extended release: 1 tab(s) orally once a day

## 2021-04-20 NOTE — PATIENT PROFILE PEDIATRIC. - TEACHING/LEARNING LEARNING PREFERENCES PEDS
audio/computer/internet/individual instruction/skill demonstration/verbal instruction/written material verbal instruction

## 2021-04-20 NOTE — H&P ADULT - ASSESSMENT
20 y/o F w/ takayasu arteritis, coarctation of the aorta,  mid-aortic syndrome, atrophic L kidney and refractory HTN s/p cardiac catheterization procedure complicated by EAT, anemia, RC and persistant HTN--otherwise hemodynamically stable on room air. Highest systolic BP have been 140's since transferred to the floor, we will continue to monitor and appreciate nephrology recommendations to assess the need for 2nd line BP med in addition to home enalapril. Episode of palpitations in March elicited on history concerning for sustained tachyarrythmia probably secondary to EAT. ECHO performed concerning for poor cardiac contractility during episodes of EAT which are occurring quite frequently therefore we will start nadolol and see if episodes improve. Anemia most likely due to procedure, we will obtain  repeat CBC  to trend. RC probably due to volume loss during procedure, we will repeat BMP tomorrow to trend. We will also obtain AM labs as recommended by rheumatology. Post catheterization care per cardiology team. We will start Heparin and aspirin daily for anticoagulation for stents.    Resp:  -RA    CVS:  -Heparin 10u/kg/hr  -Aspirin 325 daily  - Nadolol 40mg daily  -Enalapril 10mg BID  -f/u cardiology recs    Rheum:  -Humara bi-weekly, next dose 4/27  -Fe, vWF AM    Heme:  -CBC AM    RENAL:   -Oxybutynin 10mg daily  -BMP, Mg, Phos AM  -f/u nephrology recs    Neuro:  Tylenol PO PRN    FEN/GI:  -Regular diet   20 y/o F w/ takayasu arteritis, coarctation of the aorta,  mid-aortic syndrome, atrophic L kidney and refractory HTN s/p cardiac catheterization procedure complicated by EAT, anemia,  mild RC and persistant HTN--otherwise hemodynamically stable on room air. Highest systolic BP have been 140's since transferred to the floor, we will continue to monitor and appreciate nephrology recommendations to assess the need for 2nd line BP med in addition to home enalapril. Episode of palpitations in March elicited on history concerning for sustained tachyarrythmia probably secondary to EAT. ECHO performed concerning for poor cardiac contractility during episodes of EAT which are occurring quite frequently therefore we will start nadolol and see if episodes improve. Bloodwork done before procedure showed anemia and mild RC, unclear etiology we will repeat BMP and CBC tomorrow to trend. We will also obtain AM labs as recommended by rheumatology. Post catheterization care per cardiology team. We will start Heparin and aspirin daily for anticoagulation.    Resp:  -RA    CVS:  -Heparin 10u/kg/hr  -Aspirin 325 daily  - Nadolol 40mg daily  -Enalapril 10mg BID  -f/u cardiology recs    Rheum:  -Humara bi-weekly, next dose 4/27  -Fe, vWF AM    Heme:  -CBC AM    RENAL:   -Oxybutynin 10mg daily  -BMP, Mg, Phos AM  -f/u nephrology recs    Neuro:  Tylenol PO PRN    FEN/GI:  -Regular diet   22 y/o F w/ takayasu arteritis, coarctation of the aorta,  mid-aortic syndrome, atrophic L kidney and refractory HTN s/p cardiac catheterization procedure w/ anemia and  mild RC on preprocedure labs and frequent EAT and persistent HTN post procedure--hemodynamically stable on room air. Highest systolic BP have been 140's since transferred to the floor, we will continue to monitor and appreciate nephrology recommendations to assess the need for 2nd line BP med in addition to home enalapril. Episode of palpitations in March elicited on history concerning for sustained tachyarrythmia probably secondary to EAT. ECHO performed concerning for poor cardiac contractility during episodes of EAT which are occurring quite frequently therefore we will start nadolol and see if episodes improve. Bloodwork done before procedure showed anemia and mild RC, unclear etiology we will repeat BMP and CBC tomorrow to trend. We will also obtain AM labs as recommended by rheumatology. Post catheterization care per cardiology team. We will start Heparin and aspirin daily for anticoagulation.    Resp:  -RA    CVS:  -Heparin 10u/kg/hr  -Aspirin 325 daily  - Nadolol 40mg daily  -Enalapril 10mg BID  -f/u cardiology recs    Rheum:  -Humara bi-weekly, next dose 4/27  -Fe, vWF AM    Heme:  -CBC AM    RENAL:   -Oxybutynin 10mg daily  -BMP, Mg, Phos AM  -f/u nephrology recs    Neuro:  Tylenol PO PRN    FEN/GI:  -Regular diet

## 2021-04-20 NOTE — CONSULT NOTE PEDS - SUBJECTIVE AND OBJECTIVE BOX
Referring Physician:  [] Refer to History and Physical by __ for details  [] Request made by __ to evaluate the patient for:    Patient is a 21y old  Female who presents with a chief complaint of Presurgical Assessment/testing for: Bilateral heart cath aortic stent on 4/20/2021 at [facility] [place]  Doctor: Julien Bhatt (05 Apr 2021 09:53)    HPI:  A 20 yo female with Takayasu arteritis, coarctation of the aorta, middle aortic syndrome and systemic hypertension with worsening hypertension and progressive narrowing of he aorta above the level of the renal arteries.  Is now S/p cardiac cath with aortic stent placement, on 4/20/2021.     Denies chest pain palpitations syncope. + Headaches 2-3 a week, lasts the entire day, goes away on their own typically.  (05 Apr 2021 09:53)    Home Medications:  Enalapril 10mg BID  Humira 40mg subcutaneous q2wks  Oxybutynin 10mg qD     Birth Weight:		Gestational Age:  Immunizations:		[] Up to Date		[] Not up to date:    PAST MEDICAL & SURGICAL HISTORY:  Secondary hypertension  s/p stent placement in the infrarenal aorta; 2016    Coarctation of abdominal aorta    Middle aortic syndrome    Urinary reflux    Atrophy of left kidney    Recurrent UTI    Takayasu&#x27;s arteritis    Aorta disorder  middle aortic syndrome s/p stent  placement infrarenal portion of the abdominal aorta 7/2016    H/O tooth extraction  with local anesthesia          Allergies    Keflex (Hives)    Intolerances        Home Medications:  enalapril 5 mg oral tablet: 2 tab(s) orally 2 times a day (05 Apr 2021 12:12)  Humira 40 mg/0.8 mL subcutaneous kit: 1 dose(s) subcutaneous every 2 weeks (05 Apr 2021 12:12)  oxybutynin 5 mg/24 hours oral tablet, extended release: 1 tab(s) orally once a day (05 Apr 2021 12:12)      MEDICATIONS  (STANDING):  amLODIPine Oral Tab/Cap - Peds 5 milliGRAM(s) Oral daily  ampicillin IV Intermittent - Peds 2000 milliGRAM(s) IV Intermittent every 6 hours  aspirin  Oral Tab/Cap - Peds 325 milliGRAM(s) Oral daily  enalapril Oral Tab/Cap - Peds 10 milliGRAM(s) Oral two times a day  heparin   Infusion -  Peds 10 Unit(s)/kG/Hr (7.09 mL/Hr) IV Continuous <Continuous>  oxybutynin Oral Tab/Cap - Peds 10 milliGRAM(s) Oral <User Schedule>    MEDICATIONS  (PRN):  acetaminophen   Oral Tab/Cap - Peds. 650 milliGRAM(s) Oral every 6 hours PRN Mild Pain (1 - 3)      FAMILY HISTORY:  No family history of bleeding disorder    Family history of clotting disorder  mom PE, sister bilat PE, MGM DVT    No family history of adverse response to anesthesia        Behavioral History and Social Adjustment:    Review of Systems:  Constitutional:   No fever, no chills, no fatigue, no weight change  HENT: No changes in hearing, no sore throat, no rhinorrhea, no facial swelling  Eyes: no changes in vision, no eye pain  Cardiovascular: No chest pain, no palpitations  Respiratory: No shortness of breath, no cough, no wheezing  Gastrointestinal: No abdominal pain, no nausea, no emesis, no constiaption, no diarrhea, no stool in blood  Genitourinary: No gross hematuria, no dysuria, no nocturnal enuresis, no changes in urinary frequency, no changes in urinary volume, no edema  MSK: no joint pain, no joint swelling, no muscle aches, no swelling  Skin: No rashes, no jaundice  Neurologic:   no headaches, no seizures, no dizziness, no numbness    Daily Height/Length in cm: 158.7 (20 Apr 2021 07:45)    Daily   Vital Signs Last 24 Hrs  T(C): 37 (20 Apr 2021 07:45), Max: 37 (20 Apr 2021 07:45)  T(F): --  HR: 55 (20 Apr 2021 07:45) (55 - 55)  BP: 142/59 (20 Apr 2021 08:25) (142/59 - 144/69)  BP(mean): --  RR: 20 (20 Apr 2021 08:25) (18 - 20)  SpO2: 100% (20 Apr 2021 08:25) (100% - 100%)  I&O's Detail      Physical Exam:  General: No apparent distress, comfortable, sitting up in bed  HENT: NC/AT, external ear normal, nares normal with no discharge, no pharyngeal exudates/erythema, moist oral mucosa, no oral mucosal lesions  Eyes: EUGENIO, EOMI, no conjunctival injection, sclera non-icteric, no discharge  Neck: supple, full range of motion, no lymphadenopathy  Heart: Regular rate and rhythm, normal s1/s2, no murmurs/rubs/gallops  Lungs: Clear to ascultation bilaterally, good air entry to bases, no wheezing or crackles, no retractions  Abdomen: Soft, non-tender, non-distended, bowel sounds appreciated, no masses, no organomegaly  : normal genitalia, testes descended, circumcised/uncircumcised  Extremities: Warm, cap refill <2s, no edema, symmetric pulses  Skin: intact, not indurated, no rashes, no desquamation  Neuro: Awake, alert, oriented as age appropriate,no weakness, no facial asymmetry, moves all extremities      Lab Results:                        9.4    4.66  )-----------( 175      ( 20 Apr 2021 10:02 )             27.8     CBC Full  -  ( 20 Apr 2021 10:02 )  WBC Count : 4.66 K/uL  RBC Count : 3.29 M/uL  Hemoglobin : 9.4 g/dL  Hematocrit : 27.8 %  Platelet Count - Automated : 175 K/uL  Mean Cell Volume : 84.5 fL  Mean Cell Hemoglobin : 28.6 pg  Mean Cell Hemoglobin Concentration : 33.8 gm/dL  Auto Neutrophil # : x  Auto Lymphocyte # : x  Auto Monocyte # : x  Auto Eosinophil # : x  Auto Basophil # : x  Auto Neutrophil % : x  Auto Lymphocyte % : x  Auto Monocyte % : x  Auto Eosinophil % : x  Auto Basophil % : x    20 Apr 2021 10:02    140    |  111    |  20     ----------------------------<  106    4.4     |  21     |  1.32     Ca    8.5        20 Apr 2021 10:02    TPro  5.8    /  Alb  3.5    /  TBili  0.2    /  DBili  x      /  AST  16     /  ALT  12     /  AlkPhos  60     20 Apr 2021 10:02     Referring Physician:  [] Refer to History and Physical by __ for details  [] Request made by __ to evaluate the patient for:    Patient is a 21y old  Female who presents with a chief complaint of Presurgical Assessment/testing for: Bilateral heart cath aortic stent on 4/20/2021 at [facility]  Doctor: Julien Bhatt (05 Apr 2021 09:53)    HPI:  A 20 yo female with Takayasu arteritis, coarctation of the aorta, middle aortic syndrome and systemic hypertension.  She recently has been having worsening hypertension with progressive narrowing of the aorta above the level of the renal arteries.  Is now S/p cardiac cath with aortic stent placement, on 4/20/2021.     Prior to procedure, patient denied any symptoms, including leg pain.    Per patient and prior notes, patient's bps are poorly controlled, typically approx 140-150s/70-90s.  Recently increased home enalapril dosing from 5mg BID to 10mg BID.        Home Medications:  Enalapril 10mg BID  Humira 40mg subcutaneous q2wks  Oxybutynin 10mg qD     Birth Weight:		Gestational Age:  Immunizations:		[] Up to Date		[] Not up to date:    PAST MEDICAL & SURGICAL HISTORY:  Secondary hypertension  s/p stent placement in the infrarenal aorta; 2016    Coarctation of abdominal aorta    Middle aortic syndrome    Urinary reflux    Atrophy of left kidney    Recurrent UTI    Takayasu&#x27;s arteritis    Aorta disorder  middle aortic syndrome s/p stent  placement infrarenal portion of the abdominal aorta 7/2016    H/O tooth extraction  with local anesthesia          Allergies    Keflex (Hives)    Intolerances        Home Medications:  enalapril 5 mg oral tablet: 2 tab(s) orally 2 times a day (05 Apr 2021 12:12)  Humira 40 mg/0.8 mL subcutaneous kit: 1 dose(s) subcutaneous every 2 weeks (05 Apr 2021 12:12)  oxybutynin 5 mg/24 hours oral tablet, extended release: 1 tab(s) orally once a day (05 Apr 2021 12:12)      MEDICATIONS  (STANDING):  amLODIPine Oral Tab/Cap - Peds 5 milliGRAM(s) Oral daily  ampicillin IV Intermittent - Peds 2000 milliGRAM(s) IV Intermittent every 6 hours  aspirin  Oral Tab/Cap - Peds 325 milliGRAM(s) Oral daily  enalapril Oral Tab/Cap - Peds 10 milliGRAM(s) Oral two times a day  heparin   Infusion -  Peds 10 Unit(s)/kG/Hr (7.09 mL/Hr) IV Continuous <Continuous>  oxybutynin Oral Tab/Cap - Peds 10 milliGRAM(s) Oral <User Schedule>    MEDICATIONS  (PRN):  acetaminophen   Oral Tab/Cap - Peds. 650 milliGRAM(s) Oral every 6 hours PRN Mild Pain (1 - 3)      FAMILY HISTORY:  No family history of bleeding disorder    Family history of clotting disorder  mom PE, sister bilat PE, MGM DVT    No family history of adverse response to anesthesia        Behavioral History and Social Adjustment:    Review of Systems:  Constitutional:   No fever, no chills, no fatigue, no weight change  HENT: No changes in hearing, no sore throat, no rhinorrhea, no facial swelling  Eyes: no changes in vision, no eye pain  Cardiovascular: No chest pain, no palpitations  Respiratory: No shortness of breath, no cough, no wheezing  Gastrointestinal: No abdominal pain, no nausea, no emesis, no constiaption, no diarrhea, no stool in blood  Genitourinary: No gross hematuria, no dysuria, no nocturnal enuresis, no changes in urinary frequency, no changes in urinary volume, no edema  MSK: no joint pain, no joint swelling, no muscle aches, no swelling  Skin: No rashes, no jaundice  Neurologic:   no headaches, no seizures, no dizziness, no numbness    Daily Height/Length in cm: 158.7 (20 Apr 2021 07:45)    Daily   Vital Signs Last 24 Hrs  T(C): 37 (20 Apr 2021 07:45), Max: 37 (20 Apr 2021 07:45)  T(F): --  HR: 55 (20 Apr 2021 07:45) (55 - 55)  BP: 142/59 (20 Apr 2021 08:25) (142/59 - 144/69)  BP(mean): --  RR: 20 (20 Apr 2021 08:25) (18 - 20)  SpO2: 100% (20 Apr 2021 08:25) (100% - 100%)  I&O's Detail      Physical Exam:  General: No apparent distress, comfortable, sitting up in bed  HENT: NC/AT, external ear normal, nares normal with no discharge, no pharyngeal exudates/erythema, moist oral mucosa, no oral mucosal lesions  Eyes: EUGENIO, EOMI, no conjunctival injection, sclera non-icteric, no discharge  Neck: supple, full range of motion, no lymphadenopathy  Heart: Regular rate and rhythm, normal s1/s2, no murmurs/rubs/gallops  Lungs: Clear to ascultation bilaterally, good air entry to bases, no wheezing or crackles, no retractions  Abdomen: Soft, non-tender, non-distended, bowel sounds appreciated, no masses, no organomegaly  : normal genitalia, testes descended, circumcised/uncircumcised  Extremities: Warm, cap refill <2s, no edema, symmetric pulses  Skin: intact, not indurated, no rashes, no desquamation  Neuro: Awake, alert, oriented as age appropriate,no weakness, no facial asymmetry, moves all extremities      Lab Results:                        9.4    4.66  )-----------( 175      ( 20 Apr 2021 10:02 )             27.8     CBC Full  -  ( 20 Apr 2021 10:02 )  WBC Count : 4.66 K/uL  RBC Count : 3.29 M/uL  Hemoglobin : 9.4 g/dL  Hematocrit : 27.8 %  Platelet Count - Automated : 175 K/uL  Mean Cell Volume : 84.5 fL  Mean Cell Hemoglobin : 28.6 pg  Mean Cell Hemoglobin Concentration : 33.8 gm/dL  Auto Neutrophil # : x  Auto Lymphocyte # : x  Auto Monocyte # : x  Auto Eosinophil # : x  Auto Basophil # : x  Auto Neutrophil % : x  Auto Lymphocyte % : x  Auto Monocyte % : x  Auto Eosinophil % : x  Auto Basophil % : x    20 Apr 2021 10:02    140    |  111    |  20     ----------------------------<  106    4.4     |  21     |  1.32     Ca    8.5        20 Apr 2021 10:02    TPro  5.8    /  Alb  3.5    /  TBili  0.2    /  DBili  x      /  AST  16     /  ALT  12     /  AlkPhos  60     20 Apr 2021 10:02

## 2021-04-20 NOTE — DISCHARGE NOTE PROVIDER - NSDCFUSCHEDAPPT_GEN_ALL_CORE_FT
DAKSHA [place] ; 05/12/2021 ; NPP Ped Cardio [address] [place] ; 05/12/2021 ; NPP Ped Cardio [address] [place] ; 05/12/2021 ; NPP Ped Rheum [address] [place] ; 06/01/2021 ; NPP Ped Nephro [address] [facility]

## 2021-04-20 NOTE — ASU PATIENT PROFILE, PEDIATRIC - PMH
Atrophy of left kidney    Coarctation of abdominal aorta    Middle aortic syndrome    Recurrent UTI    Secondary hypertension  s/p stent placement in the infrarenal aorta; 2016  Takayasu's arteritis    Urinary reflux

## 2021-04-20 NOTE — DISCHARGE NOTE PROVIDER - CARE PROVIDERS DIRECT ADDRESSES
,[email or web address]

## 2021-04-20 NOTE — DISCHARGE NOTE PROVIDER - CARE PROVIDER_API CALL
Julien Bhatt)  Pediatric Cardiology  [address] [place] [address], [address]  [place] [address]  Phone: [phone]  Fax: [phone]  Scheduled Appointment: 05/12/2021    Benito Partida)  Pediatric Nephrology; Pediatrics  [facility], [address] [place] [address]  Phone: [phone]  Fax: [phone]  Scheduled Appointment: 06/01/2021   Julien Bhatt)  Pediatric Cardiology  [address] [facility], [address]  [place] [address]  Phone: [phone]  Fax: [phone]  Scheduled Appointment: 05/12/2021    Benito Partida)  Pediatric Nephrology; Pediatrics  [facility], [address] [place] [address]  Phone: [phone]  Fax: [phone]  Scheduled Appointment: 04/27/2021 12:30 PM    Linda Duffy  [address] [place] [address]  Phone: [phone]  Fax: (   )    -  Follow Up Time: 1-3 days

## 2021-04-20 NOTE — H&P ADULT - NSICDXPASTMEDICALHX_GEN_ALL_CORE_FT
PAST MEDICAL HISTORY:  Atrophy of left kidney     Coarctation of abdominal aorta     Middle aortic syndrome     Recurrent UTI     Secondary hypertension s/p stent placement in the infrarenal aorta; 2016    Takayasu's arteritis     Urinary reflux

## 2021-04-20 NOTE — DISCHARGE NOTE PROVIDER - HOSPITAL COURSE
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  She also has a history of atrophic L kidney w/ decreased function and recurrent UTI following w/ nephrology who manages BP medications. On renal ultrasound 8/2020  she was found to have progressive narrowing of the aorta above the renal arteries. Mom reports that she has a history of intermittent headaches for which she takes OTC pain medicine such as tylenol which improves pain. BP during those times are 190's-200's systolic. She is not aware whether treating pain improves blood pressure. Denies dizziness, changes in vision during these episodes. Mom reports that she has been told previously that she has an arrythmia several years ago but was told tehre was "nothing to do about it". Mom reports she once had an episode of palpitations in the beginning of March associated w/ "not feeling well", dizziness and headache. Denies SOB, chest pain, syncoppe during this episode. Unclear how long the palpitations were present for but she had never had symptoms of palpitations before.  PMHx: as above  Meds: Enalapril 10mg BID, amlodipine 5mg PRN, oxybutynin 10mg daily. Bethany bi-weekly, last dose 4/13.  PSHx: Vesicouretral reimplantation  Allergies: Keflex      Resp: Patient remained stable on RA throughout admission.  CVS: She was started on Heparin 10u/kg/hr and aspirin for anticoagulatoin post procedure. She will be discharged home w/ daily aspirin. Nadolol 40mg daily started for EAT causing poor cardiac contractility during episodes. Telemetry showed ____ after starting nadolol. She was continued on home dose of enalapril and discharged on ___. Catheter site remained c/d/i after procedure.   Rheum: Last dose of humara given 4/13, next dose on 4/27.  Heme: Hb 9 upon transfer, improved to __after repeat CBC.  Renal: Continued on home dose oxybutynin 10mg daily. RC 1.3 resolved to ___ before discharge.   Neuro: Post procedure pain well controlled on tylenol as needed.  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  She also has a history of atrophic L kidney w/ decreased function and recurrent UTI following w/ nephrology who manages BP medications. On renal ultrasound 8/2020  she was found to have progressive narrowing of the aorta above the renal arteries. Mom reports that she has a history of intermittent headaches for which she takes OTC pain medicine such as tylenol which improves pain. BP during those times are 190's-200's systolic. She is not aware whether treating pain improves blood pressure. Denies dizziness, changes in vision during these episodes. Mom reports that she has been told previously that she has an arrythmia several years ago but was told tehre was "nothing to do about it". Mom reports she once had an episode of palpitations in the beginning of March associated w/ "not feeling well", dizziness and headache. Denies SOB, chest pain, syncoppe during this episode. Unclear how long the palpitations were present for but she had never had symptoms of palpitations before.  PMHx: as above  Meds: Enalapril 10mg BID, amlodipine 5mg PRN, oxybutynin 10mg daily. Bethany bi-weekly, last dose 4/13.  PSHx: Vesicouretral reimplantation  Allergies: Keflex      Resp: Patient remained stable on RA throughout admission.  CVS: She was started on Heparin 10u/kg/hr and aspirin for anticoagulatoin post procedure. She will be discharged home w/ daily aspirin. Nadolol 40mg daily started for EAT causing poor cardiac contractility during episodes. Telemetry showed intermittent ectompic atrial beats w/ normal rate after starting nadolol. She was continued on home dose of enalapril and discharged on ___. Catheter site remained c/d/i after procedure.   Rheum: Last dose of humara given 4/13, next dose on 4/27.  Heme: Hb 9 before procedure; improved to __after repeat CBC.  Renal: Continued on home dose oxybutynin 10mg daily. RC 1.3 resolved to ___ before discharge.   Neuro: Post procedure pain well controlled on tylenol as needed.  FEN/GI: She tolerated a regular diet   20 yo female with Takayasu arteritis, EAT, coarctation of the aorta, middle aortic syndrome s/p stent placed previously in the infrarenal aorta, here s/p cardiac catheterization for further stenting of descending aorta due worsening disease and refractory HTN.  She also has a history of atrophic L kidney w/ decreased function and recurrent UTI following w/ nephrology who manages BP medications. On renal ultrasound 8/2020  she was found to have progressive narrowing of the aorta above the renal arteries. Mom reports that she has a history of intermittent headaches for which she takes OTC pain medicine such as tylenol which improves pain. BP during those times are 190's-200's systolic. She is not aware whether treating pain improves blood pressure. Denies dizziness, changes in vision during these episodes. Mom reports that she has been told previously that she has an arrythmia several years ago but was told tehre was "nothing to do about it". Mom reports she once had an episode of palpitations in the beginning of March associated w/ "not feeling well", dizziness and headache. Denies SOB, chest pain, syncoppe during this episode. Unclear how long the palpitations were present for but she had never had symptoms of palpitations before.  PMHx: as above  Meds: Enalapril 10mg BID, amlodipine 5mg PRN, oxybutynin 10mg daily. Bethany bi-weekly, last dose 4/13.  PSHx: Vesicouretral reimplantation  Allergies: Keflex      Resp: Patient remained stable on RA throughout admission.  CVS: She was started on Heparin 10u/kg/hr and aspirin for anticoagulatoin post procedure. She will be discharged home w/ daily aspirin. Nadolol 40mg daily started for EAT causing poor cardiac contractility during episodes. Telemetry showed intermittent ectompic atrial beats w/ normal rate after starting nadolol. She was continued on home dose of enalapril and discharged on ___. Catheter site remained c/d/i after procedure.   Rheum: Last dose of humara given 4/13, next dose on 4/27.  Heme: Hb 9 before procedure; improved to __after repeat CBC.  Renal: Continued on home dose oxybutynin 10mg daily. RC 1.3 resolved to ___ before discharge.   Neuro: Post procedure pain well controlled on tylenol as needed.  FEN/GI: She tolerated a regular diet      Vital Signs Last 24 Hrs  T(C): 37 (21 Apr 2021 05:00), Max: 37 (21 Apr 2021 05:00)  T(F): 98.6 (21 Apr 2021 05:00), Max: 98.6 (21 Apr 2021 05:00)  HR: 63 (21 Apr 2021 05:00) (0 - 97)  BP: 115/44 (21 Apr 2021 05:00) (99/55 - 142/59)  BP(mean): 60 (21 Apr 2021 05:00) (56 - 91)  RR: 16 (21 Apr 2021 05:00) (0 - 21)  SpO2: 98% (21 Apr 2021 05:00) (93% - 100%)    Gen: patient is interactive, well appearing, no acute distress  HEENT: Head NC/AT; pupils equal, responsive, reactive to light and accomodation, No nasal discharge or congestion; OP without exudates/erythema with moist mucous membranes  Neck: FROM, supple, no cervical LAD  Chest: CTA b/l, no crackles/wheezes, good air entry, no tachypnea or retractions  CV: irregular rate and rhythm, s1 and s2 present, systolic murmur at LUSB, no rubs, or gallops  Abd: soft, nontender, nondistended, no HSM appreciated, normoactive BS  : deffered  Back: no vertebral or paraspinal tenderness along entire spine; no CVAT  Extrem: Right femoral site c/d/i. No joint effusion or tenderness; FROM of all joints;  1+ peripheral pulses in LE, 2+ in upper extremities WWP.   Neuro: CN II-XII intact--did not test visual acuity. Gait wnl.   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  She also has a history of atrophic L kidney w/ decreased function and recurrent UTI following w/ nephrology who manages BP medications. On renal ultrasound 8/2020  she was found to have progressive narrowing of the aorta above the renal arteries. Mom reports that she has a history of intermittent headaches for which she takes OTC pain medicine such as tylenol which improves pain. BP during those times are 190's-200's systolic. She is not aware whether treating pain improves blood pressure. Denies dizziness, changes in vision during these episodes. Mom reports that she has been told previously that she has an arrythmia several years ago but was told tehre was "nothing to do about it". Mom reports she once had an episode of palpitations in the beginning of March associated w/ "not feeling well", dizziness and headache. Denies SOB, chest pain, syncoppe during this episode. Unclear how long the palpitations were present for but she had never had symptoms of palpitations before.  PMHx: as above  Meds: Enalapril 10mg BID, amlodipine 5mg PRN, oxybutynin 10mg daily. Bethany bi-weekly, last dose 4/13.  PSHx: Vesicouretral reimplantation  Allergies: Keflex      Resp: Patient remained stable on RA throughout admission.  CVS: She was started on Heparin 10u/kg/hr and aspirin for anticoagulatoin post procedure. She will be discharged home w/ daily aspirin. Nadolol 40mg daily started for EAT causing poor cardiac contractility during episodes. Telemetry showed intermittent ectompic atrial beats w/ normal rate after starting nadolol. Enalapril was held during admission due to risk of worsening intrinsic RC therefore she was started on amlodipine 10mg daily and discharged on this dose. Catheter site remained c/d/i after procedure.   Rheum: Last dose of humara given 4/13, next dose on 4/27.  Heme: Hb 9 before procedure; improved to 10.3 with repeat CBC w/o intervention.  Renal: Continued on home dose oxybutynin 10mg daily. RC likely due to intrinsic causes +/- pre-renal causes. As mentioned, patient was started on amlodipine 10mg daily due to increased creatinine as home enalapril was held.  Neuro: Post procedure pain well controlled on tylenol as needed.  FEN/GI: She tolerated a regular diet.      Vital Signs Last 24 Hrs  T(C): 37 (21 Apr 2021 05:00), Max: 37 (21 Apr 2021 05:00)  T(F): 98.6 (21 Apr 2021 05:00), Max: 98.6 (21 Apr 2021 05:00)  HR: 63 (21 Apr 2021 05:00) (0 - 97)  BP: 115/44 (21 Apr 2021 05:00) (99/55 - 142/59)  BP(mean): 60 (21 Apr 2021 05:00) (56 - 91)  RR: 16 (21 Apr 2021 05:00) (0 - 21)  SpO2: 98% (21 Apr 2021 05:00) (93% - 100%)    Gen: patient is interactive, well appearing, no acute distress  HEENT: Head NC/AT; pupils equal, responsive, reactive to light and accomodation, No nasal discharge or congestion; OP without exudates/erythema with moist mucous membranes  Neck: FROM, supple, no cervical LAD  Chest: CTA b/l, no crackles/wheezes, good air entry, no tachypnea or retractions  CV: irregular rate and rhythm, s1 and s2 present, systolic murmur at LUSB, no rubs, or gallops  Abd: soft, nontender, nondistended, no HSM appreciated, normoactive BS  : deffered  Back: no vertebral or paraspinal tenderness along entire spine; no CVAT  Extrem: Right femoral site c/d/i. No joint effusion or tenderness; FROM of all joints;  1+ peripheral pulses in LE, 2+ in upper extremities WWP.   Neuro: CN II-XII intact--did not test visual acuity. Gait wnl.   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  She also has a history of atrophic L kidney w/ decreased function and recurrent UTI following w/ nephrology who manages BP medications. On renal ultrasound 8/2020  she was found to have progressive narrowing of the aorta above the renal arteries. Mom reports that she has a history of intermittent headaches for which she takes OTC pain medicine such as tylenol which improves pain. BP during those times are 190's-200's systolic. She is not aware whether treating pain improves blood pressure. Denies dizziness, changes in vision during these episodes. Mom reports that she has been told previously that she has an arrythmia several years ago but was told tehre was "nothing to do about it". Mom reports she once had an episode of palpitations in the beginning of March associated w/ "not feeling well", dizziness and headache. Denies SOB, chest pain, syncoppe during this episode. Unclear how long the palpitations were present for but she had never had symptoms of palpitations before.  PMHx: as above  Meds: Enalapril 10mg BID, amlodipine 5mg PRN, oxybutynin 10mg daily. Bethany bi-weekly, last dose 4/13.  PSHx: Vesicouretral reimplantation  Allergies: Keflex      Resp: Patient remained stable on RA throughout admission.  CVS: She was started on Heparin 10u/kg/hr and aspirin for anticoagulatoin post procedure. She will be discharged home w/ daily aspirin. Nadolol 40mg daily started for EAT causing poor cardiac contractility during episodes. Telemetry showed intermittent ectompic atrial beats w/ normal rate after starting nadolol. Enalapril was held during admission due to risk of worsening intrinsic RC therefore she was started on amlodipine 10mg daily and discharged on this dose. Catheter site remained c/d/i after procedure.   Rheum: Last dose of humara given 4/13, next dose on 4/27.  Heme: Hb 9 before procedure; improved to 10.3 with repeat CBC w/o intervention.  Renal: Continued on home dose oxybutynin 10mg daily. Repeat creatinine after procedure was 1.67. ZAY for evaluation of RC showed elevated resistive index within the left renal artery. Elevated resistive index is also noted within the aorta, which may represent sequela of patient's arteritis. Borderline elevated resistive indices are seen in the right renal artery. RC likely due to intrinsic causes secondary to contrast +/- pre-renal causes. Repeat Cr. after being on IV fluids for 24 hours shows improvement to 1.43. As mentioned, enalapril held for mild CR and was started on amlodipine 10mg daily.  Neuro: Post procedure pain well controlled on tylenol as needed.  FEN/GI: She tolerated a regular diet.      Vital Signs Last 24 Hrs  T(C): 37 (21 Apr 2021 05:00), Max: 37 (21 Apr 2021 05:00)  T(F): 98.6 (21 Apr 2021 05:00), Max: 98.6 (21 Apr 2021 05:00)  HR: 63 (21 Apr 2021 05:00) (0 - 97)  BP: 115/44 (21 Apr 2021 05:00) (99/55 - 142/59)  BP(mean): 60 (21 Apr 2021 05:00) (56 - 91)  RR: 16 (21 Apr 2021 05:00) (0 - 21)  SpO2: 98% (21 Apr 2021 05:00) (93% - 100%)    Gen: patient is interactive, well appearing, no acute distress  HEENT: Head NC/AT; pupils equal, responsive, reactive to light and accomodation, No nasal discharge or congestion; OP without exudates/erythema with moist mucous membranes  Neck: FROM, supple, no cervical LAD  Chest: CTA b/l, no crackles/wheezes, good air entry, no tachypnea or retractions  CV: irregular rate and rhythm, s1 and s2 present, systolic murmur at LUSB, no rubs, or gallops  Abd: soft, nontender, nondistended, no HSM appreciated, normoactive BS  : deffered  Back: no vertebral or paraspinal tenderness along entire spine; no CVAT  Extrem: Right femoral site c/d/i. No joint effusion or tenderness; FROM of all joints;  1+ peripheral pulses in LE, 2+ in upper extremities WWP.   Neuro: CN II-XII intact--did not test visual acuity. Gait wnl.

## 2021-04-20 NOTE — ASU PATIENT PROFILE, PEDIATRIC - PSH
Aorta disorder  middle aortic syndrome s/p stent  placement infrarenal portion of the abdominal aorta 7/2016  H/O tooth extraction  with local anesthesia

## 2021-04-21 DIAGNOSIS — I15.9 SECONDARY HYPERTENSION, UNSPECIFIED: ICD-10-CM

## 2021-04-21 DIAGNOSIS — I47.1 SUPRAVENTRICULAR TACHYCARDIA: ICD-10-CM

## 2021-04-21 DIAGNOSIS — M31.4 AORTIC ARCH SYNDROME [TAKAYASU]: ICD-10-CM

## 2021-04-21 LAB
ANION GAP SERPL CALC-SCNC: 10 MMOL/L — SIGNIFICANT CHANGE UP (ref 7–14)
APPEARANCE UR: CLEAR — SIGNIFICANT CHANGE UP
BASOPHILS # BLD AUTO: 0.04 K/UL — SIGNIFICANT CHANGE UP (ref 0–0.2)
BASOPHILS NFR BLD AUTO: 0.3 % — SIGNIFICANT CHANGE UP (ref 0–2)
BILIRUB UR-MCNC: NEGATIVE — SIGNIFICANT CHANGE UP
BUN SERPL-MCNC: 28 MG/DL — HIGH (ref 7–23)
CALCIUM SERPL-MCNC: 8.5 MG/DL — SIGNIFICANT CHANGE UP (ref 8.4–10.5)
CHLORIDE SERPL-SCNC: 108 MMOL/L — HIGH (ref 98–107)
CO2 SERPL-SCNC: 22 MMOL/L — SIGNIFICANT CHANGE UP (ref 22–31)
COLOR SPEC: COLORLESS — SIGNIFICANT CHANGE UP
COVID-19 SPIKE DOMAIN AB INTERP: POSITIVE
COVID-19 SPIKE DOMAIN ANTIBODY RESULT: 120 U/ML — HIGH
CREAT ?TM UR-MCNC: 36 MG/DL — SIGNIFICANT CHANGE UP
CREAT SERPL-MCNC: 1.67 MG/DL — HIGH (ref 0.5–1.3)
DIFF PNL FLD: ABNORMAL
EOSINOPHIL # BLD AUTO: 0.01 K/UL — SIGNIFICANT CHANGE UP (ref 0–0.5)
EOSINOPHIL NFR BLD AUTO: 0.1 % — SIGNIFICANT CHANGE UP (ref 0–6)
GLUCOSE SERPL-MCNC: 99 MG/DL — SIGNIFICANT CHANGE UP (ref 70–99)
GLUCOSE UR QL: ABNORMAL
HCT VFR BLD CALC: 30.2 % — LOW (ref 34.5–45)
HGB BLD-MCNC: 10.3 G/DL — LOW (ref 11.5–15.5)
IANC: 9.5 K/UL — HIGH (ref 1.5–8.5)
IMM GRANULOCYTES NFR BLD AUTO: 0.3 % — SIGNIFICANT CHANGE UP (ref 0–1.5)
IRON SATN MFR SERPL: 121 UG/DL — SIGNIFICANT CHANGE UP (ref 30–160)
KETONES UR-MCNC: NEGATIVE — SIGNIFICANT CHANGE UP
LEUKOCYTE ESTERASE UR-ACNC: ABNORMAL
LYMPHOCYTES # BLD AUTO: 2.96 K/UL — SIGNIFICANT CHANGE UP (ref 1–3.3)
LYMPHOCYTES # BLD AUTO: 22.3 % — SIGNIFICANT CHANGE UP (ref 13–44)
MAGNESIUM SERPL-MCNC: 1.8 MG/DL — SIGNIFICANT CHANGE UP (ref 1.6–2.6)
MCHC RBC-ENTMCNC: 28.9 PG — SIGNIFICANT CHANGE UP (ref 27–34)
MCHC RBC-ENTMCNC: 34.1 GM/DL — SIGNIFICANT CHANGE UP (ref 32–36)
MCV RBC AUTO: 84.6 FL — SIGNIFICANT CHANGE UP (ref 80–100)
MONOCYTES # BLD AUTO: 0.7 K/UL — SIGNIFICANT CHANGE UP (ref 0–0.9)
MONOCYTES NFR BLD AUTO: 5.3 % — SIGNIFICANT CHANGE UP (ref 2–14)
NEUTROPHILS # BLD AUTO: 9.5 K/UL — HIGH (ref 1.8–7.4)
NEUTROPHILS NFR BLD AUTO: 71.7 % — SIGNIFICANT CHANGE UP (ref 43–77)
NITRITE UR-MCNC: NEGATIVE — SIGNIFICANT CHANGE UP
NRBC # BLD: 0 /100 WBCS — SIGNIFICANT CHANGE UP
NRBC # FLD: 0 K/UL — SIGNIFICANT CHANGE UP
PH UR: 6.5 — SIGNIFICANT CHANGE UP (ref 5–8)
PHOSPHATE SERPL-MCNC: 3.4 MG/DL — SIGNIFICANT CHANGE UP (ref 2.5–4.5)
PLATELET # BLD AUTO: 178 K/UL — SIGNIFICANT CHANGE UP (ref 150–400)
POTASSIUM SERPL-MCNC: 4.5 MMOL/L — SIGNIFICANT CHANGE UP (ref 3.5–5.3)
POTASSIUM SERPL-SCNC: 4.5 MMOL/L — SIGNIFICANT CHANGE UP (ref 3.5–5.3)
PROT UR-MCNC: NEGATIVE — SIGNIFICANT CHANGE UP
RBC # BLD: 3.57 M/UL — LOW (ref 3.8–5.2)
RBC # FLD: 12.5 % — SIGNIFICANT CHANGE UP (ref 10.3–14.5)
SARS-COV-2 IGG+IGM SERPL QL IA: 120 U/ML — HIGH
SARS-COV-2 IGG+IGM SERPL QL IA: POSITIVE
SODIUM SERPL-SCNC: 140 MMOL/L — SIGNIFICANT CHANGE UP (ref 135–145)
SODIUM UR-SCNC: 65 MMOL/L — SIGNIFICANT CHANGE UP
SP GR SPEC: 1.01 — SIGNIFICANT CHANGE UP (ref 1.01–1.02)
UROBILINOGEN FLD QL: SIGNIFICANT CHANGE UP
VWF:RCO ACT/NOR PPP PL AGG: 186 % — HIGH (ref 43–126)
WBC # BLD: 12.87 K/UL — HIGH (ref 3.8–10.5)
WBC # FLD AUTO: 12.87 K/UL — HIGH (ref 3.8–10.5)

## 2021-04-21 PROCEDURE — 99232 SBSQ HOSP IP/OBS MODERATE 35: CPT

## 2021-04-21 PROCEDURE — 93975 VASCULAR STUDY: CPT | Mod: 26

## 2021-04-21 PROCEDURE — 99233 SBSQ HOSP IP/OBS HIGH 50: CPT | Mod: 25

## 2021-04-21 PROCEDURE — 99291 CRITICAL CARE FIRST HOUR: CPT

## 2021-04-21 RX ORDER — NADOLOL 80 MG/1
2 TABLET ORAL
Qty: 0 | Refills: 0 | DISCHARGE
Start: 2021-04-21

## 2021-04-21 RX ORDER — ASPIRIN/CALCIUM CARB/MAGNESIUM 324 MG
1 TABLET ORAL
Qty: 0 | Refills: 0 | DISCHARGE
Start: 2021-04-21

## 2021-04-21 RX ORDER — SODIUM CHLORIDE 9 MG/ML
1000 INJECTION INTRAMUSCULAR; INTRAVENOUS; SUBCUTANEOUS ONCE
Refills: 0 | Status: DISCONTINUED | OUTPATIENT
Start: 2021-04-21 | End: 2021-04-21

## 2021-04-21 RX ORDER — ACETAMINOPHEN 500 MG
2 TABLET ORAL
Qty: 0 | Refills: 0 | DISCHARGE
Start: 2021-04-21

## 2021-04-21 RX ORDER — SODIUM CHLORIDE 9 MG/ML
1000 INJECTION, SOLUTION INTRAVENOUS
Refills: 0 | Status: DISCONTINUED | OUTPATIENT
Start: 2021-04-21 | End: 2021-04-22

## 2021-04-21 RX ORDER — NADOLOL 80 MG/1
1 TABLET ORAL
Qty: 30 | Refills: 0
Start: 2021-04-21 | End: 2021-05-20

## 2021-04-21 RX ORDER — SODIUM CHLORIDE 9 MG/ML
1000 INJECTION, SOLUTION INTRAVENOUS ONCE
Refills: 0 | Status: COMPLETED | OUTPATIENT
Start: 2021-04-21 | End: 2021-04-21

## 2021-04-21 RX ADMIN — Medication 325 MILLIGRAM(S): at 21:11

## 2021-04-21 RX ADMIN — HEPARIN SODIUM 7.09 UNIT(S)/KG/HR: 5000 INJECTION INTRAVENOUS; SUBCUTANEOUS at 07:20

## 2021-04-21 RX ADMIN — SODIUM CHLORIDE 70 MILLILITER(S): 9 INJECTION, SOLUTION INTRAVENOUS at 12:05

## 2021-04-21 RX ADMIN — NADOLOL 40 MILLIGRAM(S): 80 TABLET ORAL at 21:11

## 2021-04-21 RX ADMIN — Medication 10 MILLIGRAM(S): at 10:08

## 2021-04-21 RX ADMIN — SODIUM CHLORIDE 1000 MILLILITER(S): 9 INJECTION, SOLUTION INTRAVENOUS at 11:06

## 2021-04-21 RX ADMIN — Medication 133.34 MILLIGRAM(S): at 03:00

## 2021-04-21 NOTE — PROGRESS NOTE PEDS - SUBJECTIVE AND OBJECTIVE BOX
INTERVAL HISTORY: in the post-operative period Tisha was noted to have EAT which decreased in frequency overnight while on Nadolol but persisted. She did not experience any symptoms during this fast beats. Labs showing rising creatinine     RESPIRATORY SUPPORT:   NUTRITION: * (*kcal/kg/day)       @ 07:01  -   @ 07:00  --------------------------------------------------------  IN: 1683.6 mL / OUT: 1150 mL / NET: 533.6 mL      INTRAVASCULAR ACCESS: PIV    MEDICATIONS:  nadolol Oral Tab/Cap - Peds 40 milliGRAM(s) Oral daily  aspirin  Oral Tab/Cap - Peds 325 milliGRAM(s) Oral daily  oxybutynin Oral Tab/Cap - Peds 10 milliGRAM(s) Oral <User Schedule>  sodium chloride 0.9%. - Pediatric 1000 milliLiter(s) IV Continuous <Continuous>    PHYSICAL EXAMINATION:  Vital signs - Weight (kg): 70.9 ( @ 07:45)  T(C): 37 (21 @ 11:00), Max: 37 (21 @ 05:00)  HR: 58 (21 @ 11:00) (0 - 97)  BP: 124/66 (21 @ 11:00) (98/60 - 140/72)  RR: 17 (21 @ 11:00) (0 - 21)  SpO2: 99% (21 @ 11:00) (93% - 100%)    General - non-dysmorphic appearance, well-developed, in no distress.  Skin - no rash, no desquamation, no cyanosis.  Eyes / ENT - no conjunctival injection, sclerae anicteric, external ears & nares normal, mucous membranes moist.  Pulmonary - normal inspiratory effort, no retractions, lungs clear to auscultation bilaterally, no wheezes, no rales.  Cardiovascular - normal rate, regular rhythm, normal S1 & S2, no murmurs, no rubs, no gallops, capillary refill < 2sec, normal pulses.  Gastrointestinal - soft, non-distended, non-tender, no hepatosplenomegaly (liver palpable at right costal margin).  Musculoskeletal - no joint swelling, no clubbing, no edema.  Extremities +2 pulses UE B/L, +1 pulses LE B/L  Neurologic / Psychiatric - alert, oriented as age-appropriate, affect appropriate, moves all extremities, normal tone.    LABORATORY TESTS:                          10.3  CBC:   12.87 )-----------( 178   (21 @ 08:59)                          30.2               140   |  108   |  28                 Ca: 8.5    BMP:   ----------------------------< 99     M.8   (21 @ 09:08)             4.5    |  22    | 1.67               Ph: 3.4      LFT:     TPro: 5.8 / Alb: 3.5 / TBili: 0.2 / DBili: x / AST: 16 / ALT: 12 / AlkPhos: 60   (21 @ 10:02)          IMAGING STUDIES:  Electrocardiogram - (21)      Telemetry - (-)     Chest x-ray - (*date)     Echocardiogram - ()  Summary:   1. Technically difficult imaging secondary to poor acoustic windows.   2. Follow up study after cardiac catheterization mainly to evaluate myocardial function.   3. History of mid-thoracic aorta syndrome, s/p transcatheter stenting of the infra-renal abdominal aorta. Persistent hypertension on medications.   4. S/p additional stent placement and dilatation of existing stent in the abdominal aorta 2021.   5. Hypoplasia of the aortic isthmus. There is a fibrous ridge noted in the aortic isthmus. Doppler profile shows persistence of antegrade flow in diastole (coarctation pattern). Peak systolic gradient across the aortic isthmus of 25 mmHg.   6. Continuous antegrade flow in the descending aorta at the level of diaphragm.   7. Normal right ventricular morphology with qualitatively normal size and systolic function.   8. Qualitatively normal left ventricular systolic function in the setting of irregular heart beats.   9. No pericardial effusion.     INTERVAL HISTORY: in the post-operative period Tisha was noted to have EAT which decreased in frequency overnight while on Nadolol but persisted. She did not experience any symptoms during this fast beats. Labs showing rising creatinine level.    RESPIRATORY SUPPORT: RA       @ 07:01  -   @ 07:00  --------------------------------------------------------  IN: 1683.6 mL / OUT: 1150 mL / NET: 533.6 mL      INTRAVASCULAR ACCESS: PIV    MEDICATIONS:  nadolol Oral Tab/Cap - Peds 40 milliGRAM(s) Oral daily  aspirin  Oral Tab/Cap - Peds 325 milliGRAM(s) Oral daily  oxybutynin Oral Tab/Cap - Peds 10 milliGRAM(s) Oral <User Schedule>  sodium chloride 0.9%. - Pediatric 1000 milliLiter(s) IV Continuous <Continuous>    PHYSICAL EXAMINATION:  Vital signs - Weight (kg): 70.9 ( @ 07:45)  T(C): 37 (21 @ 11:00), Max: 37 (21 @ 05:00)  HR: 58 (21 @ 11:00) (0 - 97)  BP: 124/66 (21 @ 11:00) (98/60 - 140/72)  RR: 17 (21 @ 11:00) (0 - 21)  SpO2: 99% (21 @ 11:00) (93% - 100%)    General - non-dysmorphic appearance, well-developed, in no distress.  Skin - no rash, no desquamation, no cyanosis.  Eyes / ENT - no conjunctival injection, sclerae anicteric, external ears & nares normal, mucous membranes moist.  Pulmonary - normal inspiratory effort, no retractions, lungs clear to auscultation bilaterally, no wheezes, no rales.  Cardiovascular - normal rate, regular rhythm, normal S1 & S2, no murmurs, no rubs, no gallops, capillary refill < 2sec, normal pulses.  Gastrointestinal - soft, non-distended, non-tender, no hepatosplenomegaly (liver palpable at right costal margin).  Musculoskeletal - no joint swelling, no clubbing, no edema.  Extremities +2 pulses UE B/L, +1 pulses LE B/L  Neurologic / Psychiatric - alert, oriented as age-appropriate, affect appropriate, moves all extremities, normal tone.    LABORATORY TESTS:                          10.3  CBC:   12.87 )-----------( 178   (21 @ 08:59)                          30.2               140   |  108   |  28                 Ca: 8.5    BMP:   ----------------------------< 99     M.8   (21 @ 09:08)             4.5    |  22    | 1.67               Ph: 3.4      LFT:     TPro: 5.8 / Alb: 3.5 / TBili: 0.2 / DBili: x / AST: 16 / ALT: 12 / AlkPhos: 60   (21 @ 10:02)          IMAGING STUDIES:  Electrocardiogram - (21)  NSR with ectopic atrial tachycardia (150bpm)    Telemetry - (-) Less ectopic atrial tachycardia (overnight) compared to yesterday afternoon.    Echocardiogram - ()  Summary:   1. Technically difficult imaging secondary to poor acoustic windows.   2. Follow up study after cardiac catheterization mainly to evaluate myocardial function.   3. History of mid-thoracic aorta syndrome, s/p transcatheter stenting of the infra-renal abdominal aorta. Persistent hypertension on medications.   4. S/p additional stent placement and dilatation of existing stent in the abdominal aorta 2021.   5. Hypoplasia of the aortic isthmus. There is a fibrous ridge noted in the aortic isthmus. Doppler profile shows persistence of antegrade flow in diastole (coarctation pattern). Peak systolic gradient across the aortic isthmus of 25 mmHg.   6. Continuous antegrade flow in the descending aorta at the level of diaphragm.   7. Normal right ventricular morphology with qualitatively normal size and systolic function.   8. Qualitatively normal left ventricular systolic function in the setting of irregular heart beats.   9. No pericardial effusion.     INTERVAL HISTORY: in the post-cath period Tisha was noted to have EAT which decreased in frequency overnight after Nadolol was started but has persisted. She did not experience any symptoms during her arrhythmias. Labs showing rising creatinine.    RESPIRATORY SUPPORT: RA  NUTRITION: regular diet PO ad nayeli    INTAKE/OUTPUT:   @ 07:01  -   @ 07:00  --------------------------------------------------------  IN: 1683.6 mL / OUT: 1150 mL / NET: 533.6 mL    INTRAVASCULAR ACCESS: PIV    MEDICATIONS:  nadolol Oral Tab/Cap - Peds 40 milliGRAM(s) Oral daily  aspirin  Oral Tab/Cap - Peds 325 milliGRAM(s) Oral daily  oxybutynin Oral Tab/Cap - Peds 10 milliGRAM(s) Oral <User Schedule>  sodium chloride 0.9%. - Pediatric 1000 milliLiter(s) IV Continuous <Continuous>    PHYSICAL EXAMINATION:  Vital signs - Weight (kg): 70.9 ( @ 07:45)  T(C): 37 (21 @ 11:00), Max: 37 (21 @ 05:00)  HR: 58 (21 @ 11:00) (0 - 97)  BP: 124/66 (21 @ 11:00) (98/60 - 140/72)  RR: 17 (21 @ 11:00) (0 - 21)  SpO2: 99% (21 @ 11:00) (93% - 100%)    General - non-dysmorphic appearance, well-developed, in no distress.  Skin - no rash, no desquamation, no cyanosis.  Eyes / ENT - no conjunctival injection, sclerae anicteric, external ears & nares normal, mucous membranes moist.  Pulmonary - normal inspiratory effort, no retractions, lungs clear to auscultation bilaterally, no wheezes, no rales.  Cardiovascular - normal rate, regular rhythm, normal S1 & S2, no murmurs, no rubs, no gallops, capillary refill < 2sec, normal 2+ upper extremity pulses, 1+ lower extremity pulses.  Gastrointestinal - soft, non-distended, non-tender, no hepatosplenomegaly (liver palpable at right costal margin).  Musculoskeletal - no joint swelling, no clubbing, no edema.  Extremities +2 pulses UE B/L, +1 pulses LE B/L.  Neurologic / Psychiatric - alert, oriented as age-appropriate, affect appropriate, moves all extremities, normal tone.    LABORATORY TESTS:                          10.3  CBC:   12.87 )-----------( 178   (21 @ 08:59)                          30.2               140   |  108   |  28                 Ca: 8.5    BMP:   ----------------------------< 99     M.8   (21 @ 09:08)             4.5    |  22    | 1.67               Ph: 3.4      LFT:     TPro: 5.8 / Alb: 3.5 / TBili: 0.2 / DBili: x / AST: 16 / ALT: 12 / AlkPhos: 60   (21 @ 10:02)    IMAGING STUDIES:  Electrocardiogram - (21) ectopic atrial tachycardia.    Telemetry - (-) sinus rhythm, frequent premature atrial contractions often in pattern of atrial bigeminy, frequent short non-sustained runs (3 to 6 beats) of ectopic atrial tachycardia.    Echocardiogram - ()   1. Technically difficult imaging secondary to poor acoustic windows.   2. Follow up study after cardiac catheterization mainly to evaluate myocardial function.   3. History of mid-thoracic aorta syndrome, s/p transcatheter stenting of the infra-renal abdominal aorta. Persistent hypertension on medications.   4. S/p additional stent placement and dilatation of existing stent in the abdominal aorta 2021.   5. Hypoplasia of the aortic isthmus. There is a fibrous ridge noted in the aortic isthmus. Doppler profile shows persistence of antegrade flow in diastole (coarctation pattern). Peak systolic gradient across the aortic isthmus of 25 mmHg.   6. Continuous antegrade flow in the descending aorta at the level of diaphragm.   7. Normal right ventricular morphology with qualitatively normal size and systolic function.   8. Qualitatively normal left ventricular systolic function in the setting of irregular heart beats.   9. No pericardial effusion.

## 2021-04-21 NOTE — PROGRESS NOTE PEDS - ASSESSMENT
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  Now S/p cardiac cath with multiple stents placed in distal aorta, on 4/20/2021. Overall, doing well, since stent placement, with low to normal bps ('[id number]/[id number]) on home enalapril and now nadolol.  This am, noted to have additional rise in Cr, from 1.13 at PST to 1.67 today.  RC can be secondary to a number of factors, and most likely due to a combination of those factors.  Factors include the use of contrast during cardiac cath, changed in renal perfusion following procedure (although less likely given that the stents were placed distally to bifurcation of renal artery), the patients home enalapril use, and potential UTI (most recently treated for UTI at end of 3/2021).  Given the addition of nadolol to this patient's medication regimen,  with the patietns now low to normal bps, and worsening RC, will plan to hold home enalapril, given a concern for decreased renal perfusion, and reduction of eGFR.  Addditionally, will sent urine studies, including UCx and FeNa to help determine if this is a pre-renal vs intrinsic process.  Additionally, with plan for renal imaging, and IV hydration, with repeat labs later this evening.       - to remain on Nadolol qD for EAT and hypertension per cardiology  - hold home enalapril  - If patient's bps are elevated, can consider PRN medications vs alternative anti-hypertensive agents.  In the setting of this worsening RC, would prefer to avoid use of ACEi/ARBs  - please sent UA, UCx, Urine Na, Urine Cr  - obtain renal sono w/ duplex to investigate renal perfusion  - give NS bolus, and start on maintenance IVF's  - obtain repeat BMP in pm  - pending results of sono w/ repeat BMP, if Cr downtrending can likely plan for d/c home if otherwise stable    Plan discussed with patient, Cardiology, and PICU

## 2021-04-21 NOTE — PROGRESS NOTE PEDS - ASSESSMENT
21 year old female with Takayasu arteritis, coarctation of the aorta and mid-aortic syndrome. s/p stent placement in infrarenal aorta in 2018; atrophic left kidney and refractory HTN; now s/p cardiac cath and multiple stent placements in aorta.  In the PICU post cath, pt noted to have a dysrhythmia, which is c/w EAT.  Also with an increase in creatinine from PST labs.    Exam:  Gen - awake, alert and active; NAD  Resp - breathing comfortably; lungs clear with good air entry  CV - irregularly irregular rhythm; no murmur; distal pulses 2+ in upper extremities, 1+ in lower extremities; cap refill < 2 seconds  Abd - soft, NT, ND, no HSM  Ext - warm and well-perfused; nonedematous; dressing on right femoral cath site is c/d/i    PLAN:  Monitor cath site   Heparin 10 u/kg/hour   mg daily  Starting Nadolol for EAT  Telemetry monitoring  Restart pt's home pmkqayon89 year old female with Takayasu arteritis, coarctation of the aorta and mid-aortic syndrome. s/p stent placement in infrarenal aorta in 2018; atrophic left kidney and refractory HTN; now s/p cardiac cath and multiple stent placements in aorta.  In the PICU post cath, pt noted to have a dysrhythmia, which is c/w EAT.  Also with an increase in creatinine from PST labs.      PLAN:  Monitor cath site   Heparin 10 u/kg/hour   mg daily  Starting Nadolol for EAT  Telemetry monitoring  Restart pt's home Enalapril  Hydralazine prn for systolic BP > 150  Repeat lytes in a.m.    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 s/p stent placement in infrarenal aorta in 2018; atrophic left kidney and refractory HTN; now s/p cardiac cath and multiple stent placements in aorta.  In the PICU post cath, pt noted to have a dysrhythmia, which is c/w EAT; and with RC most likely secondary to contrast      PLAN:    CV:  Telemetry monitoring  Pressure dressing removed from cath site  d/c Heparin   mg daily  Nadolol for EAT  Hydralazine prn for systolic BP > 150    Renal/FEN:  Will give fluid bolus now, and start IVF  Hold Enalapril  Send urine lytes and creatinine  Renal ultrasound/doppler

## 2021-04-21 NOTE — PROGRESS NOTE PEDS - ASSESSMENT
In summary, MICHAEL CROOKS is a 21y old female with Takayasu arteritis, EAT, atrophic left kidney, refractory hypertension, coarctation of the aorta, middle aortic syndrome s/p stent placed previously in the infrarenal aorta, admitted to PICU s/p cardiac catheterization for further stenting of descending aorta due to worsening disease and refractory HTN.  She was noted to have worsening EAT in the post operative period and was started on Nadolol which was well tolerated. The patient is critically ill in this postoperative period, and requires ongoing ICU monitoring for risk of cardiorespiratory compromise.    CV:  - Continuous cardiopulmonary/telemetry monitoring.  - Continue Nadolol PO  - HOLD Enalapril in setting of RC  - EKGs as indicated.  - DC heparin today  - Continue Aspirin 325mg   - Close monitoring of BP in UE and LE  - pressure dressing removed today  - serial checks of pedal pulses     RESP:  - Stable on RA    FEN/GI:  - Regular diet as tolerated  - Give 1 bolus and start mIVF    ID:  - s/p perioperative Ancef    RENAL: rising creatinine  - trend creatine  - UA and urine lytes   - Renal US today   - continue home oxybutynin 10mg daily  - Nephro following    RHEM  - continue home Humera as prescribed  In summary, MICHAEL CROOKS is a 21y old female with Takayasu arteritis, EAT, atrophic left kidney, refractory hypertension, coarctation of the aorta, middle aortic syndrome s/p stent placed previously in the infrarenal aorta, admitted to PICU s/p cardiac catheterization for further stenting of descending aorta due to worsening disease and refractory HTN.  She was noted to have worsening EAT in the post operative period and was started on Nadolol which was well tolerated. The patient is critically ill in this postoperative period, and requires ongoing ICU monitoring for risk of cardiorespiratory compromise.    CV:  - Continuous cardiopulmonary/telemetry monitoring.  - Continue Nadolol 40mg PO QD  - HOLD Enalapril in setting of RC  - EKGs as indicated.  - DC heparin today  - Continue Aspirin 325mg   - Close monitoring of BP in UE and LE  - pressure dressing removed today  - serial checks of pedal pulses     RESP:  - Stable on RA    FEN/GI:  - Regular diet as tolerated  - Give 1 bolus and start mIVF    ID:  - s/p perioperative Ancef    RENAL: rising creatinine  - trend creatine  - UA and urine lytes   - Renal doppler US today   - continue home oxybutynin 10mg daily  - Nephro following    RHEM  - continue home Humera as prescribed  In summary, MICHAEL CROOKS is a 21y old female with Takayasu arteritis, atrophic left kidney, refractory hypertension, very mild coarctation of the aorta, and middle aortic syndrome s/p stent placed previously in the infrarenal aorta, admitted to PICU s/p cardiac catheterization for further stenting of descending aorta due to worsening disease and refractory HTN.  Upon PICU admission she was noted to have frequent runs of non-sustained ectopic atrial tachycardia, and upon review of previous EKGs there have been short runs in the past. She has been started on Nadolol, which has resulted in some improvements in the frequency of arrhythmias. Post-cath course has also been complicated by rising creatinine (pre-renal vs. contrast-induced). The patient is critically ill, and requires ongoing ICU monitoring for risk of cardiorespiratory compromise.    CV:  - Continuous cardiopulmonary/telemetry monitoring.  - Continue Nadolol PO.  - HOLD Enalapril in setting of RC, as per Nephrology.  - EKGs as indicated.  - D/c heparin today.  - Continue Aspirin 325mg PO QD.  - Close monitoring of BP and pulses in UE and LE.  - Pressure dressing removed today.    RESP:  - Stable on RA.    FEN/GI:  - Regular diet as tolerated.  - Give 1 bolus and start mIVF.    ID:  - S/p milana-cath Ancef.    RENAL: rising creatinine  - Trend creatine.  - UA and urine lytes .  - Renal US today.  - Continue home oxybutynin 10mg daily.  - Nephro following.    RHEUM:  - Continue home Humera as prescribed.    DISPO:  - Plan discussed at length with the patient and mother at the bedside.

## 2021-04-21 NOTE — PROGRESS NOTE PEDS - SUBJECTIVE AND OBJECTIVE BOX
RESPIRATORY:  RR: 16 (04-21-21 @ 05:00) (0 - 21)  SpO2: 98% (04-21-21 @ 05:00) (93% - 100%)  Wt(kg): --    Respiratory Support:              Respiratory Medications:          Comments:      CARDIOVASCULAR  HR: 63 (04-21-21 @ 05:00) (0 - 97)  BP: 115/44 (04-21-21 @ 05:00) (99/55 - 142/59)  Wt(kg): --  ABP: --  ABP(mean): --  Wt(kg): --  CVP(mm Hg): --  CVP(cm H2O): --  [ ] NIRS:  [ ] ECHO:   Cardiac Rhythm: NSR    Cardiovascular Medications:  enalapril Oral Tab/Cap - Peds 10 milliGRAM(s) Oral two times a day  nadolol Oral Tab/Cap - Peds 40 milliGRAM(s) Oral daily      Comments:    HEMATOLOGIC/ONCOLOGIC:  (04-20 @ 10:02):               9.4    4.66 )-----------(175                27.8   Neurophils% (auto):   x       manual%: x      Lymphocytes% (auto):  x       manual%: x      Eosinphils% (auto):   x       manual%: x      Bands%: x       blasts%: x            Sedimentation Rate, Erythrocyte: 8 mm/hr (04-20-21 @ 10:02)    Transfusions last 24 hours:	  [ ] PRBC	[ ] Platelets    [ ] FFP	[ ] Cryoprecipitate    Hematologic/Oncologic Medications:  heparin   Infusion -  Peds 10 Unit(s)/kG/Hr IV Continuous <Continuous>    DVT Prophylaxis:    Comments:    INFECTIOUS DISEASE:  T(C): 37 (04-21-21 @ 05:00), Max: 37 (04-21-21 @ 05:00)  Wt(kg): --    Cultures:  RECENT CULTURES:        Medications:      Labs:        FLUIDS/ELECTROLYTES/NUTRITION:    Weight:  Daily Weight Gm: 34896 (20 Apr 2021 07:45)    04-20 @ 07:01  -  04-21 @ 07:00  --------------------------------------------------------  IN: 1683.6 mL / OUT: 1150 mL / NET: 533.6 mL          Labs:  04-20 @ 10:02    140    |  111    |  20     ----------------------------<  106    4.4     |  21     |  1.32     I.Ca:x     Mg:x     Ph:x            04-20 @ 10:02  TPro  5.8     AST  16     Alb  3.5      ALT  12     TBili  0.2    AlkPhos  60     DBili  x      Trig: x          	  Gastrointestinal Medications:      Comments:      NEUROLOGY:  [ ] SBS:	[ ] STEPHANIE-1:         [ ] BIS:    aspirin  Oral Tab/Cap - Peds 325 milliGRAM(s) Oral daily      Adequacy of sedation and pain control has been assessed and adjusted    Comments:      OTHER MEDICATIONS:  Endocrine/Metabolic Medications:    Genitourinary Medications:  oxybutynin Oral Tab/Cap - Peds 10 milliGRAM(s) Oral <User Schedule>    Topical/Other Medications:      Necessity of urinary, arterial, and venous catheters discussed      PHYSICAL EXAM:      IMAGING STUDIES:        Parent/Guardian is at the bedside:   [ ] Yes   [  ] No  Patient and Parent/Guardian updated as to the progress/plan of care:  [  ] Yes	[  ] No    [ ] The patient remains in critical and unstable condition, and requires ICU care and monitoring  [ ] The patient is improving but requires continued monitoring and adjustment of therapy No acute events overnight.  Started on Nadolol.  Did not require prn meds for hypertension.  Femoral cath site pressure dressing removed this morning.     RESPIRATORY:  RR: 16 (04-21-21 @ 05:00) (0 - 21)  SpO2: 98% (04-21-21 @ 05:00) (93% - 100%)      Respiratory Support:  room air     Respiratory Medications:          Comments:      CARDIOVASCULAR  HR: 63 (04-21-21 @ 05:00) (0 - 97)  BP: 115/44 (04-21-21 @ 05:00) (99/55 - 142/59)  [ ] NIRS:  [ ] ECHO:   Cardiac Rhythm: NSR    Cardiovascular Medications:  enalapril Oral Tab/Cap - Peds 10 milliGRAM(s) Oral two times a day  nadolol Oral Tab/Cap - Peds 40 milliGRAM(s) Oral daily      Comments:    HEMATOLOGIC/ONCOLOGIC:  (04-20 @ 10:02):               9.4    4.66 )-----------(175                27.8   Neurophils% (auto):   x       manual%: x      Lymphocytes% (auto):  x       manual%: x      Eosinphils% (auto):   x       manual%: x      Bands%: x       blasts%: x            Sedimentation Rate, Erythrocyte: 8 mm/hr (04-20-21 @ 10:02)    Transfusions last 24 hours:	  [ ] PRBC	[ ] Platelets    [ ] FFP	[ ] Cryoprecipitate    Hematologic/Oncologic Medications:  heparin   Infusion -  Peds 10 Unit(s)/kG/Hr IV Continuous <Continuous>    DVT Prophylaxis:    Comments:    INFECTIOUS DISEASE:  T(C): 37 (04-21-21 @ 05:00), Max: 37 (04-21-21 @ 05:00)      Cultures:  RECENT CULTURES:        Medications:      Labs:        FLUIDS/ELECTROLYTES/NUTRITION:    Weight:  Daily Weight Gm: 70551 (20 Apr 2021 07:45)    04-20 @ 07:01  -  04-21 @ 07:00  --------------------------------------------------------  IN: 1683.6 mL / OUT: 1150 mL / NET: 533.6 mL          Labs:  Repeat Creatinine 1.67    04-20 @ 10:02    140    |  111    |  20     ----------------------------<  106    4.4     |  21     |  1.32     I.Ca:x     Mg:x     Ph:x            04-20 @ 10:02  TPro  5.8     AST  16     Alb  3.5      ALT  12     TBili  0.2    AlkPhos  60     DBili  x      Trig: x          	  Gastrointestinal Medications:      Comments:      NEUROLOGY:  [ ] SBS:	[ ] STEPHANIE-1:         [ ] BIS:    aspirin  Oral Tab/Cap - Peds 325 milliGRAM(s) Oral daily      Adequacy of sedation and pain control has been assessed and adjusted    Comments:      OTHER MEDICATIONS:  Endocrine/Metabolic Medications:    Genitourinary Medications:  oxybutynin Oral Tab/Cap - Peds 10 milliGRAM(s) Oral <User Schedule>    Topical/Other Medications:      Necessity of urinary, arterial, and venous catheters discussed      PHYSICAL EXAM:      IMAGING STUDIES:        Parent/Guardian is at the bedside:   [x] Yes   [  ] No  Patient and Parent/Guardian updated as to the progress/plan of care:  [x] Yes	[  ] No    [ ] The patient remains in critical and unstable condition, and requires ICU care and monitoring  [ ] The patient is improving but requires continued monitoring and adjustment of therapy No acute events overnight.  Started on Nadolol.  Did not require prn meds for hypertension.  Femoral cath site pressure dressing removed this morning.     RESPIRATORY:  RR: 16 (04-21-21 @ 05:00) (0 - 21)  SpO2: 98% (04-21-21 @ 05:00) (93% - 100%)      Respiratory Support:  room air     Respiratory Medications:          Comments:      CARDIOVASCULAR  HR: 63 (04-21-21 @ 05:00) (0 - 97)  BP: 115/44 (04-21-21 @ 05:00) (99/55 - 142/59)  [ ] NIRS:  [ ] ECHO:   Cardiac Rhythm: NSR with runs of EAT    Cardiovascular Medications:  enalapril Oral Tab/Cap - Peds 10 milliGRAM(s) Oral two times a day  nadolol Oral Tab/Cap - Peds 40 milliGRAM(s) Oral daily      Comments:    HEMATOLOGIC/ONCOLOGIC:  (04-20 @ 10:02):               9.4    4.66 )-----------(175                27.8   Neurophils% (auto):   x       manual%: x      Lymphocytes% (auto):  x       manual%: x      Eosinphils% (auto):   x       manual%: x      Bands%: x       blasts%: x            Sedimentation Rate, Erythrocyte: 8 mm/hr (04-20-21 @ 10:02)    Transfusions last 24 hours:	  [ ] PRBC	[ ] Platelets    [ ] FFP	[ ] Cryoprecipitate    Hematologic/Oncologic Medications:  heparin   Infusion -  Peds 10 Unit(s)/kG/Hr IV Continuous <Continuous>    DVT Prophylaxis:    Comments:    INFECTIOUS DISEASE:  T(C): 37 (04-21-21 @ 05:00), Max: 37 (04-21-21 @ 05:00)      Cultures:  RECENT CULTURES:        Medications:      Labs:        FLUIDS/ELECTROLYTES/NUTRITION:    Weight:  Daily Weight Gm: 30359 (20 Apr 2021 07:45)    04-20 @ 07:01  -  04-21 @ 07:00  --------------------------------------------------------  IN: 1683.6 mL / OUT: 1150 mL / NET: 533.6 mL          Labs:  Repeat Creatinine 1.67    04-20 @ 10:02    140    |  111    |  20     ----------------------------<  106    4.4     |  21     |  1.32     I.Ca:x     Mg:x     Ph:x            04-20 @ 10:02  TPro  5.8     AST  16     Alb  3.5      ALT  12     TBili  0.2    AlkPhos  60     DBili  x      Trig: x          	  Gastrointestinal Medications:      Comments:      NEUROLOGY:  [ ] SBS:	[ ] STEPHANIE-1:         [ ] BIS:    aspirin  Oral Tab/Cap - Peds 325 milliGRAM(s) Oral daily      Adequacy of sedation and pain control has been assessed and adjusted    Comments:      OTHER MEDICATIONS:  Endocrine/Metabolic Medications:    Genitourinary Medications:  oxybutynin Oral Tab/Cap - Peds 10 milliGRAM(s) Oral <User Schedule>    Topical/Other Medications:      Necessity of urinary, arterial, and venous catheters discussed      PHYSICAL EXAM:  Gen - awake, alert and interactive; NAD  Resp - breathing comfortably; lungs clear with good air entry  CV - irregularly irregular rhythm (but more regular than yesterday); no murmur; distal pulses 2+ in upper extremities; good femoral pulses; DP pulses faintly palpable; cap refill < 2 seconds  Abd - soft, NT, ND, no HSM  Ext - warm and well-perfused; nonedematous; femoral cath site now with steri-strips    IMAGING STUDIES:        Parent/Guardian is at the bedside:   [x] Yes   [  ] No  Patient and Parent/Guardian updated as to the progress/plan of care:  [x] Yes	[  ] No    [x] The patient remains in critical and unstable condition, and requires ICU care and monitoring  [ ] The patient is improving but requires continued monitoring and adjustment of therapy    Critical Care time by attending physician, excluding procedure time = 40 minutes

## 2021-04-22 ENCOUNTER — TRANSCRIPTION ENCOUNTER (OUTPATIENT)
Age: 21
End: 2021-04-22

## 2021-04-22 VITALS
SYSTOLIC BLOOD PRESSURE: 139 MMHG | RESPIRATION RATE: 21 BRPM | DIASTOLIC BLOOD PRESSURE: 96 MMHG | OXYGEN SATURATION: 98 % | TEMPERATURE: 99 F | HEART RATE: 67 BPM

## 2021-04-22 LAB
ANION GAP SERPL CALC-SCNC: 8 MMOL/L — SIGNIFICANT CHANGE UP (ref 7–14)
BUN SERPL-MCNC: 19 MG/DL — SIGNIFICANT CHANGE UP (ref 7–23)
CALCIUM SERPL-MCNC: 8.3 MG/DL — LOW (ref 8.4–10.5)
CHLORIDE SERPL-SCNC: 110 MMOL/L — HIGH (ref 98–107)
CO2 SERPL-SCNC: 24 MMOL/L — SIGNIFICANT CHANGE UP (ref 22–31)
CREAT SERPL-MCNC: 1.43 MG/DL — HIGH (ref 0.5–1.3)
GLUCOSE SERPL-MCNC: 101 MG/DL — HIGH (ref 70–99)
POTASSIUM SERPL-MCNC: 4.2 MMOL/L — SIGNIFICANT CHANGE UP (ref 3.5–5.3)
POTASSIUM SERPL-SCNC: 4.2 MMOL/L — SIGNIFICANT CHANGE UP (ref 3.5–5.3)
SODIUM SERPL-SCNC: 142 MMOL/L — SIGNIFICANT CHANGE UP (ref 135–145)

## 2021-04-22 PROCEDURE — 99232 SBSQ HOSP IP/OBS MODERATE 35: CPT

## 2021-04-22 PROCEDURE — 99233 SBSQ HOSP IP/OBS HIGH 50: CPT

## 2021-04-22 PROCEDURE — 99238 HOSP IP/OBS DSCHRG MGMT 30/<: CPT | Mod: GC

## 2021-04-22 RX ORDER — ATENOLOL 25 MG/1
1 TABLET ORAL
Qty: 30 | Refills: 0
Start: 2021-04-22 | End: 2021-05-21

## 2021-04-22 RX ORDER — ATENOLOL 25 MG/1
50 TABLET ORAL DAILY
Refills: 0 | Status: DISCONTINUED | OUTPATIENT
Start: 2021-04-22 | End: 2021-04-22

## 2021-04-22 RX ORDER — AMLODIPINE BESYLATE 2.5 MG/1
1 TABLET ORAL
Qty: 30 | Refills: 0
Start: 2021-04-22 | End: 2021-05-21

## 2021-04-22 RX ADMIN — Medication 10 MILLIGRAM(S): at 15:09

## 2021-04-22 RX ADMIN — SODIUM CHLORIDE 70 MILLILITER(S): 9 INJECTION, SOLUTION INTRAVENOUS at 03:05

## 2021-04-22 NOTE — PROGRESS NOTE PEDS - SUBJECTIVE AND OBJECTIVE BOX
Patient is a 21y old  Female who presents with a chief complaint of s/p cardiac catheterization (2021 11:14)    Interval History:  No acute events overnight  BPs progressively increasing while off home enalapril    MEDICATIONS  (STANDING):  aspirin  Oral Tab/Cap - Peds 325 milliGRAM(s) Oral daily  ATENolol Oral Tab/Cap - Peds 50 milliGRAM(s) Oral daily  oxybutynin Oral Tab/Cap - Peds 10 milliGRAM(s) Oral <User Schedule>    MEDICATIONS  (PRN):  acetaminophen   Oral Tab/Cap - Peds. 650 milliGRAM(s) Oral every 6 hours PRN Mild Pain (1 - 3)      Vital Signs Last 24 Hrs  T(C): 37 (2021 14:00), Max: 37 (2021 17:00)  T(F): 98.6 (2021 14:00), Max: 98.6 (2021 17:00)  HR: 67 (2021 14:00) (64 - 77)  BP: 139/96 (2021 14:00) (119/34 - 150/60)  BP(mean): 108 (2021 14:00) (54 - 108)  RR: 21 (2021 14:00) (15 - 26)  SpO2: 98% (2021 14:00) (97% - 100%)  I&O's Detail    2021 07:01  -  2021 07:00  --------------------------------------------------------  IN:    Heparin Infusion - Pediatric/: 21.3 mL    Lactated Ringers Bolus - Pediatric: 1000 mL    Oral Fluid: 1240 mL    sodium chloride 0.9% - Pediatric: 1120 mL  Total IN: 3381.3 mL    OUT:    Voided (mL): 750 mL  Total OUT: 750 mL    Total NET: 2631.3 mL      2021 07:01  -  2021 16:48  --------------------------------------------------------  IN:    Oral Fluid: 840 mL    sodium chloride 0.9% - Pediatric: 210 mL  Total IN: 1050 mL    OUT:  Total OUT: 0 mL    Total NET: 1050 mL        Daily     Daily     Physical Exam  General: No apparent distress, comfortable, sitting up in bed  HENT: NC/AT, external ear normal, normal nares with no discharte, no pharyngeal exudates/erythema, moist oral mucosa  Eyes: EUGENIO, EOMI, no conjunctival injection, sclera non-icteric, no discharge  Neck: supple, full range of motion, no lymphadenopathy  Heart: Regular rate and rhythm, normal s1/s2,   Lungs: Clear to ascultation bilaterally, good air entry to bases, no wheezing or crackles, no retractions  Abdomen: Soft, non-tender, non-distended, bowel sounds appreciated, no masses, no organomegaly  Extremities: Warm, cap refill <2s, no edema, symmetric pulses  Skin: intact, no rashes or lesions  Neuro: Awake, alert, oriented, appropriately responsive, no facial asymmetry, moves all extremities    Lab Results:                        10.3   12.87 )-----------( 178      ( 2021 08:59 )             30.2     CBC Full  -  ( 2021 08:59 )  WBC Count : 12.87 K/uL  RBC Count : 3.57 M/uL  Hemoglobin : 10.3 g/dL  Hematocrit : 30.2 %  Platelet Count - Automated : 178 K/uL  Mean Cell Volume : 84.6 fL  Mean Cell Hemoglobin : 28.9 pg  Mean Cell Hemoglobin Concentration : 34.1 gm/dL  Auto Neutrophil # : 9.50 K/uL  Auto Lymphocyte # : 2.96 K/uL  Auto Monocyte # : 0.70 K/uL  Auto Eosinophil # : 0.01 K/uL  Auto Basophil # : 0.04 K/uL  Auto Neutrophil % : 71.7 %  Auto Lymphocyte % : 22.3 %  Auto Monocyte % : 5.3 %  Auto Eosinophil % : 0.1 %  Auto Basophil % : 0.3 %      2021 08:53    142    |  110    |  19     ----------------------------<  101    4.2     |  24     |  1.43   2021 09:08    140    |  108    |  28     ----------------------------<  99     4.5     |  22     |  1.67     Ca    8.3        2021 08:53  Ca    8.5        2021 09:08  Phos  3.4       2021 09:08  Mg     1.8       2021 09:08    TPro  5.8    /  Alb  3.5    /  TBili  0.2    /  DBili  x      /  AST  16     /  ALT  12     /  AlkPhos  60     2021 10:02        Urinalysis Basic - ( 2021 11:34 )    Color: Colorless / Appearance: Clear / S.011 / pH: x  Gluc: x / Ketone: Negative  / Bili: Negative / Urobili: <2 mg/dL   Blood: x / Protein: Negative / Nitrite: Negative   Leuk Esterase: Moderate / RBC: 1 /HPF / WBC 15 /HPF   Sq Epi: x / Non Sq Epi: 2 /HPF / Bacteria: Negative            Microbiology:   none      Radiology:    EXAM:  US DPLX KIDNEY(IES)    PROCEDURE DATE:  2021   INTERPRETATION:  CLINICAL INFORMATION: Decreased renal artery perfusion status post stent placement; history of Takayasu's arteritis.  COMPARISON: Renal ultrasound 2018. Renal duplex 7/15/2016.  TECHNIQUE: Color and spectral Doppler evaluation of the kidneys. Study is limited secondary to patient motion.    FINDINGS:  Right kidney:  11.4 cm. No renal mass or calculi. Mild fullness of the collecting system and proximal right ureter. Simple cysts measuring up to 1.4 cm.  Left kidney:  7.7 cm. No renal mass, hydronephrosis or calculi. Renal cysts. A hypoechoic well-circumscribed lesion in the upper pole measuring 2.9 cm may represent an additional cyst with internal debris versus a prominent pyramid.    Urinary bladder: Within normal limits.    Color and spectral Doppler reveals normal, symmetric blood flow throughout both kidneys.  Peak aortic velocity is 188 cm/sec. The resistive index is .85.  IVC/Renal Veins: Patent.    RIGHT  Renal Artery:  Peak systolic velocity is 164 cm/sec origin, 290 cm/sec proximal, 222 cm/sec mid, and 178 cm/sec distal.  Upper Segmental Artery:  RI =. 60  Middle Segmental Artery: RI =. 66  Lower Segmental Artery: RI = .62    LEFT  Renal Artery:  Peak systolic velocity is 53 cm/sec at the origin with a resistive index of 82. The proximal segment is not well evaluated; the mid/distal portion peak systolic velocity is 80 cm/s.  Upper Segmental Artery:  RI = .65  Middle Segmental Artery: RI = .64  Lower Segmental Artery: RI = .74    IMPRESSION:  Atrophic left kidney.  Elevated resistive index is noted within the left renal artery. Elevated resistive index is also noted within the aorta, which may represent sequela of patient's arteritis. Borderline elevated resistive indices are seen in the right renal artery. Further evaluation with CTA/MRA of the renal arteries can be performed to exclude renal artery stenosis, as clinically warranted.

## 2021-04-22 NOTE — PROGRESS NOTE PEDS - ASSESSMENT
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  Upon PICU admission she was noted to have frequent runs of non-sustained ectopic atrial tachycardia, and upon review of previous EKGs there have been short runs in the past. She has been started on beta blockers, which resulted in some improvements in the frequency of arrhythmias (although there is still very frequent atrial ectopy). Post-cath course has also been complicated by rising creatinine (pre-renal vs. contrast-induced), which has now improved after hydration. The patient is critically ill, and requires ongoing ICU monitoring for risk of cardiorespiratory compromise.    CV:  - Continuous cardiopulmonary/telemetry monitoring.  - Switch Nadolol to Atenolol (see if insurance will cover this drug).  - Discuss with Nephrology re: anti-hypertensives (Enalapril currently on hold; consider Amlodipine).  - EKGs as indicated.  - Continue Aspirin 325mg PO QD.    RESP:  - Stable on RA.    FEN/GI:  - Regular diet as tolerated.    ID:  - S/p milana-cath Ancef.    RENAL:  - Continue home oxybutynin 10mg daily.  - Nephro following.    RHEUM:  - Continue home Humera as prescribed.    DISPO:  - Plan discussed at length with the patient and mother at the bedside. 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  Upon PICU admission she was noted to have frequent runs of non-sustained ectopic atrial tachycardia, and upon review of previous EKGs there have been short runs in the past. She has been started on beta blockers, which resulted in some improvements in the frequency of arrhythmias (although there is still very frequent atrial ectopy). Post-cath course has also been complicated by rising creatinine (pre-renal vs. contrast-induced), which has now improved after hydration. The patient is critically ill, and requires ongoing ICU monitoring for risk of cardiorespiratory compromise.    CV:  - Continuous cardiopulmonary/telemetry monitoring.  - Switch Nadolol to Atenolol (see if insurance will cover this drug).  - Discuss with Nephrology re: anti-hypertensives (Enalapril currently on hold; consider Amlodipine).  - EKGs as indicated.  - Continue Aspirin 325mg PO QD.    RESP:  - Stable on RA.    FEN/GI:  - Regular diet as tolerated.    ID:  - S/p milana-cath Ancef.    RENAL:  - Continue home oxybutynin 10mg daily.  - Nephro following.    RHEUM:  - Continue home Humera as prescribed.    DISPO:  - Plan discussed at length with the patient and mother at the bedside.  - Cleared for discharge from a cardiac standpoint. Patient will follow-up with Dr. Bhatt as directed. 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  Upon PICU admission she was noted to have frequent runs of non-sustained ectopic atrial tachycardia, and upon review of previous EKGs there have been short runs in the past. She has been started on beta blockers, which resulted in some improvements in the frequency of arrhythmias (although there is still very frequent atrial ectopy). Post-cath course has also been complicated by rising creatinine (pre-renal vs. contrast-induced), which has now improved after hydration. The patient is critically ill, and requires ongoing ICU monitoring for risk of cardiorespiratory compromise.    CV:  - Continuous cardiopulmonary/telemetry monitoring.  - Switch Nadolol to Atenolol 50mg PO QD (see if insurance will cover this drug).  - Discuss with Nephrology re: anti-hypertensives (Enalapril currently on hold; consider Amlodipine).  - EKGs as indicated.  - Continue Aspirin 325mg PO QD.    RESP:  - Stable on RA.    FEN/GI:  - Regular diet as tolerated.    ID:  - S/p milana-cath Ancef.    RENAL:  - Continue home oxybutynin 10mg daily.  - Nephro following.    RHEUM:  - Continue home Humera as prescribed.    DISPO:  - Plan discussed at length with the patient and mother at the bedside.  - Cleared for discharge from a cardiac standpoint. Patient will follow-up with Dr. Bhatt as directed.

## 2021-04-22 NOTE — PROGRESS NOTE PEDS - SUBJECTIVE AND OBJECTIVE BOX
RESPIRATORY:  RR: 26 (21 @ 05:00) (14 - 26)  SpO2: 99% (21 @ 05:00) (93% - 100%)  Wt(kg): --    Respiratory Support:              Respiratory Medications:          Comments:      CARDIOVASCULAR  HR: 77 (21 @ 05:00) (56 - 77)  BP: 145/55 (21 @ 05:00) (98/60 - 145/55)  Wt(kg): --  ABP: --  ABP(mean): --  Wt(kg): --  CVP(mm Hg): --  CVP(cm H2O): --  [ ] NIRS:  [ ] ECHO:   Cardiac Rhythm: NSR    Cardiovascular Medications:  nadolol Oral Tab/Cap - Peds 40 milliGRAM(s) Oral daily      Comments:    HEMATOLOGIC/ONCOLOGIC:  ( @ 08:59):               10.3   12.87)-----------(178                30.2   Neurophils% (auto):   71.7    manual%: x      Lymphocytes% (auto):  22.3    manual%: x      Eosinphils% (auto):   0.1     manual%: x      Bands%: x       blasts%: x        ( @ 10:02):               9.4    4.66 )-----------(175                27.8   Neurophils% (auto):   x       manual%: x      Lymphocytes% (auto):  x       manual%: x      Eosinphils% (auto):   x       manual%: x      Bands%: x       blasts%: x            Sedimentation Rate, Erythrocyte: 8 mm/hr (21 @ 10:02)    Transfusions last 24 hours:	  [ ] PRBC	[ ] Platelets    [ ] FFP	[ ] Cryoprecipitate    Hematologic/Oncologic Medications:    DVT Prophylaxis:    Comments:    INFECTIOUS DISEASE:  T(C): 36.9 (21 @ 05:00), Max: 37 (21 @ 11:00)  Wt(kg): --    Cultures:  RECENT CULTURES:        Medications:      Labs:        FLUIDS/ELECTROLYTES/NUTRITION:    Weight:  Daily Weight Gm: 36444 (2021 07:45)     @ 07:01  -   @ 07:00  --------------------------------------------------------  IN: 3381.3 mL / OUT: 750 mL / NET: 2631.3 mL          Labs:   @ 09:08    140    |  108    |  28     ----------------------------<  99     4.5     |  22     |  1.67     I.Ca:x     M.8   Ph:3.4         @ 10:02    140    |  111    |  20     ----------------------------<  106    4.4     |  21     |  1.32     I.Ca:x     Mg:x     Ph:x             @ 10:02  TPro  5.8     AST  16     Alb  3.5      ALT  12     TBili  0.2    AlkPhos  60     DBili  x      Trig: x          	  Gastrointestinal Medications:  sodium chloride 0.9%. - Pediatric 1000 milliLiter(s) IV Continuous <Continuous>      Comments:      NEUROLOGY:  [ ] SBS:	[ ] STEPHANIE-1:         [ ] BIS:    aspirin  Oral Tab/Cap - Peds 325 milliGRAM(s) Oral daily      Adequacy of sedation and pain control has been assessed and adjusted    Comments:      OTHER MEDICATIONS:  Endocrine/Metabolic Medications:    Genitourinary Medications:  oxybutynin Oral Tab/Cap - Peds 10 milliGRAM(s) Oral <User Schedule>    Topical/Other Medications:      Necessity of urinary, arterial, and venous catheters discussed      PHYSICAL EXAM:      IMAGING STUDIES:        Parent/Guardian is at the bedside:   [ ] Yes   [  ] No  Patient and Parent/Guardian updated as to the progress/plan of care:  [  ] Yes	[  ] No    [ ] The patient remains in critical and unstable condition, and requires ICU care and monitoring  [ ] The patient is improving but requires continued monitoring and adjustment of therapy No acute events overnight.  Pt has been more hypertensive in the last 24 hours.    RESPIRATORY:  RR: 26 (21 @ 05:00) (14 - 26)  SpO2: 99% (21 @ 05:00) (93% - 100%)      Respiratory Support:  room air       Respiratory Medications:          Comments:      CARDIOVASCULAR  HR: 77 (21 @ 05:00) (56 - 77)  BP: 145/55 (21 @ 05:00) (98/60 - 145/55)  [ ] NIRS:  [ ] ECHO:   Cardiac Rhythm: NSR    Cardiovascular Medications:  nadolol Oral Tab/Cap - Peds 40 milliGRAM(s) Oral daily      Comments:    HEMATOLOGIC/ONCOLOGIC:  ( @ 08:59):               10.3   12.87)-----------(178                30.2   Neurophils% (auto):   71.7    manual%: x      Lymphocytes% (auto):  22.3    manual%: x      Eosinphils% (auto):   0.1     manual%: x      Bands%: x       blasts%: x        ( @ 10:02):               9.4    4.66 )-----------(175                27.8   Neurophils% (auto):   x       manual%: x      Lymphocytes% (auto):  x       manual%: x      Eosinphils% (auto):   x       manual%: x      Bands%: x       blasts%: x          Sedimentation Rate, Erythrocyte: 8 mm/hr (21 @ 10:02)    Transfusions last 24 hours:	  [ ] PRBC	[ ] Platelets    [ ] FFP	[ ] Cryoprecipitate    Hematologic/Oncologic Medications:    DVT Prophylaxis:    Comments:    INFECTIOUS DISEASE:  T(C): 36.9 (21 @ 05:00), Max: 37 (21 @ 11:00)      Cultures:  RECENT CULTURES:        Medications:      Labs:        FLUIDS/ELECTROLYTES/NUTRITION:    Weight:  Daily Weight Gm: 04359 (2021 07:45)     @ 07:01  -   @ 07:00  --------------------------------------------------------  IN: 3381.3 mL / OUT: 750 mL / NET: 2631.3 mL          Labs:   @ 09:08    140    |  108    |  28     ----------------------------<  99     4.5     |  22     |  1.67     I.Ca:x     M.8   Ph:3.4         @ 10:02    140    |  111    |  20     ----------------------------<  106    4.4     |  21     |  1.32     I.Ca:x     Mg:x     Ph:x             @ 10:02  TPro  5.8     AST  16     Alb  3.5      ALT  12     TBili  0.2    AlkPhos  60     DBili  x      Trig: x          	  Gastrointestinal Medications:  sodium chloride 0.9%. - Pediatric 1000 milliLiter(s) IV Continuous <Continuous>      Comments:      NEUROLOGY:  [ ] SBS:	[ ] STEPHANIE-1:         [ ] BIS:    aspirin  Oral Tab/Cap - Peds 325 milliGRAM(s) Oral daily      Adequacy of sedation and pain control has been assessed and adjusted    Comments:      OTHER MEDICATIONS:  Endocrine/Metabolic Medications:    Genitourinary Medications:  oxybutynin Oral Tab/Cap - Peds 10 milliGRAM(s) Oral <User Schedule>    Topical/Other Medications:      Necessity of urinary, arterial, and venous catheters discussed      PHYSICAL EXAM:      IMAGING STUDIES:  < from: US Duplex Kidneys (21 @ 13:27) >  IMPRESSION:  Atrophic left kidney.  Elevated resistive index is noted within the left renal artery. Elevated resistive index is also noted within the aorta, which may represent sequela of patient's arteritis. Borderline elevated resistive indices are seen in the right renal artery. Further evaluation with CTA/MRA of the renal arteries can be performed to exclude renal artery stenosis, as clinically warranted.    < end of copied text >        Parent/Guardian is at the bedside:   [x] Yes   [  ] No  Patient and Parent/Guardian updated as to the progress/plan of care:  [x] Yes	[  ] No    [ ] The patient remains in critical and unstable condition, and requires ICU care and monitoring  [ ] The patient is improving but requires continued monitoring and adjustment of therapy No acute events overnight.  Pt has been more hypertensive in the last 24 hours.    RESPIRATORY:  RR: 26 (21 @ 05:00) (14 - 26)  SpO2: 99% (21 @ 05:00) (93% - 100%)      Respiratory Support:  room air       Respiratory Medications:          Comments:      CARDIOVASCULAR  HR: 77 (21 @ 05:00) (56 - 77)  BP: 145/55 (21 @ 05:00) (98/60 - 145/55)  [ ] NIRS:  [ ] ECHO:   Cardiac Rhythm: NSR    Cardiovascular Medications:  nadolol Oral Tab/Cap - Peds 40 milliGRAM(s) Oral daily      Comments:    HEMATOLOGIC/ONCOLOGIC:  ( @ 08:59):               10.3   12.87)-----------(178                30.2   Neurophils% (auto):   71.7    manual%: x      Lymphocytes% (auto):  22.3    manual%: x      Eosinphils% (auto):   0.1     manual%: x      Bands%: x       blasts%: x        ( @ 10:02):               9.4    4.66 )-----------(175                27.8   Neurophils% (auto):   x       manual%: x      Lymphocytes% (auto):  x       manual%: x      Eosinphils% (auto):   x       manual%: x      Bands%: x       blasts%: x          Sedimentation Rate, Erythrocyte: 8 mm/hr (21 @ 10:02)    Transfusions last 24 hours:	  [ ] PRBC	[ ] Platelets    [ ] FFP	[ ] Cryoprecipitate    Hematologic/Oncologic Medications:    DVT Prophylaxis:    Comments:    INFECTIOUS DISEASE:  T(C): 36.9 (21 @ 05:00), Max: 37 (21 @ 11:00)      Cultures:  RECENT CULTURES:        Medications:      Labs:        FLUIDS/ELECTROLYTES/NUTRITION:    Weight:  Daily Weight Gm: 91907 (2021 07:45)     @ 07:01  -   @ 07:00  --------------------------------------------------------  IN: 3381.3 mL / OUT: 750 mL / NET: 2631.3 mL          Labs:   @ 09:08    140    |  108    |  28     ----------------------------<  99     4.5     |  22     |  1.67     I.Ca:x     M.8   Ph:3.4         @ 10:02    140    |  111    |  20     ----------------------------<  106    4.4     |  21     |  1.32     I.Ca:x     Mg:x     Ph:x             @ 10:02  TPro  5.8     AST  16     Alb  3.5      ALT  12     TBili  0.2    AlkPhos  60     DBili  x      Trig: x          	  Gastrointestinal Medications:  sodium chloride 0.9%. - Pediatric 1000 milliLiter(s) IV Continuous <Continuous>      Comments:      NEUROLOGY:  [ ] SBS:	[ ] STEPHANIE-1:         [ ] BIS:    aspirin  Oral Tab/Cap - Peds 325 milliGRAM(s) Oral daily      Adequacy of sedation and pain control has been assessed and adjusted    Comments:      OTHER MEDICATIONS:  Endocrine/Metabolic Medications:    Genitourinary Medications:  oxybutynin Oral Tab/Cap - Peds 10 milliGRAM(s) Oral <User Schedule>    Topical/Other Medications:      Necessity of urinary, arterial, and venous catheters discussed      PHYSICAL EXAM:  Gen - awake, alert and interactive; NAD  Resp - breathing comfortably; lungs clear with good air entry  CV - irregularly irregular rhythm (but more regular than yesterday); no murmur; distal pulses 2+ in upper extremities; good femoral pulses; DP pulses faintly palpable; cap refill < 2 seconds  Abd - soft, NT, ND, no HSM  Ext - warm and well-perfused; nonedematous; femoral cath site now with steri-strips      IMAGING STUDIES:  < from: US Duplex Kidneys (21 @ 13:27) >  IMPRESSION:  Atrophic left kidney.  Elevated resistive index is noted within the left renal artery. Elevated resistive index is also noted within the aorta, which may represent sequela of patient's arteritis. Borderline elevated resistive indices are seen in the right renal artery. Further evaluation with CTA/MRA of the renal arteries can be performed to exclude renal artery stenosis, as clinically warranted.    < end of copied text >        Parent/Guardian is at the bedside:   [x] Yes   [  ] No  Patient and Parent/Guardian updated as to the progress/plan of care:  [x] Yes	[  ] No    [ ] The patient remains in critical and unstable condition, and requires ICU care and monitoring  [ ] The patient is improving but requires continued monitoring and adjustment of therapy No acute events overnight.  Pt has been more hypertensive in the last 24 hours (but still less than what she was pre-cath).    RESPIRATORY:  RR: 26 (21 @ 05:00) (14 - 26)  SpO2: 99% (21 @ 05:00) (93% - 100%)      Respiratory Support:  room air       Respiratory Medications:          Comments:      CARDIOVASCULAR  HR: 77 (21 @ 05:00) (56 - 77)  BP: 145/55 (21 @ 05:00) (98/60 - 145/55)  [ ] NIRS:  [ ] ECHO:   Cardiac Rhythm: NSR    Cardiovascular Medications:  nadolol Oral Tab/Cap - Peds 40 milliGRAM(s) Oral daily      Comments:    HEMATOLOGIC/ONCOLOGIC:  ( @ 08:59):               10.3   12.87)-----------(178                30.2   Neurophils% (auto):   71.7    manual%: x      Lymphocytes% (auto):  22.3    manual%: x      Eosinphils% (auto):   0.1     manual%: x      Bands%: x       blasts%: x        ( @ 10:02):               9.4    4.66 )-----------(175                27.8   Neurophils% (auto):   x       manual%: x      Lymphocytes% (auto):  x       manual%: x      Eosinphils% (auto):   x       manual%: x      Bands%: x       blasts%: x          Sedimentation Rate, Erythrocyte: 8 mm/hr (21 @ 10:02)    Transfusions last 24 hours:	  [ ] PRBC	[ ] Platelets    [ ] FFP	[ ] Cryoprecipitate    Hematologic/Oncologic Medications:    DVT Prophylaxis:    Comments:    INFECTIOUS DISEASE:  T(C): 36.9 (21 @ 05:00), Max: 37 (21 @ 11:00)      Cultures:  RECENT CULTURES:        Medications:      Labs:        FLUIDS/ELECTROLYTES/NUTRITION:    Weight:  Daily Weight Gm: 14504 (2021 07:45)     @ 07:01  -   @ 07:00  --------------------------------------------------------  IN: 3381.3 mL / OUT: 750 mL / NET: 2631.3 mL          Labs:   @ 09:08    140    |  108    |  28     ----------------------------<  99     4.5     |  22     |  1.67     I.Ca:x     M.8   Ph:3.4         @ 10:02    140    |  111    |  20     ----------------------------<  106    4.4     |  21     |  1.32     I.Ca:x     Mg:x     Ph:x             @ 10:02  TPro  5.8     AST  16     Alb  3.5      ALT  12     TBili  0.2    AlkPhos  60     DBili  x      Trig: x          	  Gastrointestinal Medications:  sodium chloride 0.9%. - Pediatric 1000 milliLiter(s) IV Continuous <Continuous>      Comments:      NEUROLOGY:  [ ] SBS:	[ ] STEPHANIE-1:         [ ] BIS:    aspirin  Oral Tab/Cap - Peds 325 milliGRAM(s) Oral daily      Adequacy of sedation and pain control has been assessed and adjusted    Comments:      OTHER MEDICATIONS:  Endocrine/Metabolic Medications:    Genitourinary Medications:  oxybutynin Oral Tab/Cap - Peds 10 milliGRAM(s) Oral <User Schedule>    Topical/Other Medications:      Necessity of urinary, arterial, and venous catheters discussed      PHYSICAL EXAM:  Gen - awake, alert and interactive; NAD  Resp - breathing comfortably; lungs clear with good air entry  CV - irregularly irregular rhythm (but more regular than yesterday); no murmur; distal pulses 2+ in upper extremities; good femoral pulses; DP pulses faintly palpable; cap refill < 2 seconds  Abd - soft, NT, ND, no HSM  Ext - warm and well-perfused; nonedematous; femoral cath site now with steri-strips      IMAGING STUDIES:  < from: US Duplex Kidneys (21 @ 13:27) >  IMPRESSION:  Atrophic left kidney.  Elevated resistive index is noted within the left renal artery. Elevated resistive index is also noted within the aorta, which may represent sequela of patient's arteritis. Borderline elevated resistive indices are seen in the right renal artery. Further evaluation with CTA/MRA of the renal arteries can be performed to exclude renal artery stenosis, as clinically warranted.    < end of copied text >        Parent/Guardian is at the bedside:   [x] Yes   [  ] No  Patient and Parent/Guardian updated as to the progress/plan of care:  [x] Yes	[  ] No    [ ] The patient remains in critical and unstable condition, and requires ICU care and monitoring  [ ] The patient is improving but requires continued monitoring and adjustment of therapy No acute events overnight.  Pt has been more hypertensive in the last 24 hours (but still less than what she was pre-cath).    RESPIRATORY:  RR: 26 (21 @ 05:00) (14 - 26)  SpO2: 99% (21 @ 05:00) (93% - 100%)      Respiratory Support:  room air       Respiratory Medications:          Comments:      CARDIOVASCULAR  HR: 77 (21 @ 05:00) (56 - 77)  BP: 145/55 (21 @ 05:00) (98/60 - 145/55)  [ ] NIRS:  [ ] ECHO:   Cardiac Rhythm: NSR    Cardiovascular Medications:  nadolol Oral Tab/Cap - Peds 40 milliGRAM(s) Oral daily      Comments:    HEMATOLOGIC/ONCOLOGIC:  ( @ 08:59):               10.3   12.87)-----------(178                30.2   Neurophils% (auto):   71.7    manual%: x      Lymphocytes% (auto):  22.3    manual%: x      Eosinphils% (auto):   0.1     manual%: x      Bands%: x       blasts%: x        ( @ 10:02):               9.4    4.66 )-----------(175                27.8   Neurophils% (auto):   x       manual%: x      Lymphocytes% (auto):  x       manual%: x      Eosinphils% (auto):   x       manual%: x      Bands%: x       blasts%: x          Sedimentation Rate, Erythrocyte: 8 mm/hr (21 @ 10:02)    Transfusions last 24 hours:	  [ ] PRBC	[ ] Platelets    [ ] FFP	[ ] Cryoprecipitate    Hematologic/Oncologic Medications:    DVT Prophylaxis:    Comments:    INFECTIOUS DISEASE:  T(C): 36.9 (21 @ 05:00), Max: 37 (21 @ 11:00)      Cultures:  RECENT CULTURES:        Medications:      Labs:        FLUIDS/ELECTROLYTES/NUTRITION:    Weight:  Daily Weight Gm: 91481 (2021 07:45)     @ 07:01  -   @ 07:00  --------------------------------------------------------  IN: 3381.3 mL / OUT: 750 mL / NET: 2631.3 mL          Labs:   @ 09:08    140    |  108    |  28     ----------------------------<  99     4.5     |  22     |  1.67     I.Ca:x     M.8   Ph:3.4         @ 10:02    140    |  111    |  20     ----------------------------<  106    4.4     |  21     |  1.32     I.Ca:x     Mg:x     Ph:x             @ 10:02  TPro  5.8     AST  16     Alb  3.5      ALT  12     TBili  0.2    AlkPhos  60     DBili  x      Trig: x          	  Gastrointestinal Medications:  sodium chloride 0.9%. - Pediatric 1000 milliLiter(s) IV Continuous <Continuous>      Comments:      NEUROLOGY:  [ ] SBS:	[ ] STEPHANIE-1:         [ ] BIS:    aspirin  Oral Tab/Cap - Peds 325 milliGRAM(s) Oral daily      Adequacy of sedation and pain control has been assessed and adjusted    Comments:      OTHER MEDICATIONS:  Endocrine/Metabolic Medications:    Genitourinary Medications:  oxybutynin Oral Tab/Cap - Peds 10 milliGRAM(s) Oral <User Schedule>    Topical/Other Medications:      Necessity of urinary, arterial, and venous catheters discussed      PHYSICAL EXAM:  Gen - awake, alert and interactive; NAD  Resp - breathing comfortably; lungs clear with good air entry  CV - irregularly irregular rhythm; no murmur; distal pulses 2+ in upper extremities; good femoral pulses; DP pulses faintly palpable; cap refill < 2 seconds  Abd - soft, NT, ND, no HSM  Ext - warm and well-perfused; nonedematous      IMAGING STUDIES:  < from: US Duplex Kidneys (21 @ 13:27) >  IMPRESSION:  Atrophic left kidney.  Elevated resistive index is noted within the left renal artery. Elevated resistive index is also noted within the aorta, which may represent sequela of patient's arteritis. Borderline elevated resistive indices are seen in the right renal artery. Further evaluation with CTA/MRA of the renal arteries can be performed to exclude renal artery stenosis, as clinically warranted.    < end of copied text >        Parent/Guardian is at the bedside:   [x] Yes   [  ] No  Patient and Parent/Guardian updated as to the progress/plan of care:  [x] Yes	[  ] No    [ ] The patient remains in critical and unstable condition, and requires ICU care and monitoring  [x] The patient is improving but requires continued monitoring and adjustment of therapy

## 2021-04-22 NOTE — PROGRESS NOTE PEDS - ASSESSMENT
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 s/p stent placement in infrarenal aorta in 2018; atrophic left kidney and refractory HTN; now s/p cardiac cath and multiple stent placements in aorta.  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 s/p stent placement in infrarenal aorta in 2018; atrophic left kidney and refractory HTN; now s/p cardiac cath and multiple stent placements in aorta.  In the PICU post cath, pt noted to have a dysrhythmia, which is c/w EAT; and with RC most likely secondary to contrast, creatinine now improving.       PLAN:    CV:  Telemetry monitoring   mg daily  Changing Nadolol to Atenolol (for insurance issues)      Renal/FEN:  d/c IVF; regular diet  f/u with Nephro regarding possibly adding a second po antihypertensive   also f/u with Nephro regarding renal doppler results

## 2021-04-22 NOTE — DISCHARGE NOTE NURSING/CASE MANAGEMENT/SOCIAL WORK - PATIENT PORTAL LINK FT
You can access the FollowMyHealth Patient Portal offered by [facility] by registering at the following website: [email or web address]. By joining [employer]’s FollowMyHealth portal, you will also be able to view your health information using other applications (apps) compatible with our system.

## 2021-04-22 NOTE — PROGRESS NOTE PEDS - SUBJECTIVE AND OBJECTIVE BOX
INTERVAL HISTORY: Insurance refused coverage of Nadolol so remained hospitalized overnight.    RESPIRATORY SUPPORT: RA    NUTRITION: regular diet PO ad nayeli.    INTAKE/OUTPUT:  04-21 @ 07:01  -  04-22 @ 07:00  --------------------------------------------------------  IN: 3381.3 mL / OUT: 750 mL / NET: 2631.3 mL    INTRAVASCULAR ACCESS: None    MEDICATIONS:  nadolol Oral Tab/Cap - Peds 40 milliGRAM(s) Oral daily  aspirin  Oral Tab/Cap - Peds 325 milliGRAM(s) Oral daily  oxybutynin Oral Tab/Cap - Peds 10 milliGRAM(s) Oral <User Schedule>  sodium chloride 0.9%. - Pediatric 1000 milliLiter(s) IV Continuous <Continuous>    PHYSICAL EXAMINATION:  Vital signs - Weight (kg): 70.9 (04-20 @ 07:45)  T(C): 36.8 (04-22-21 @ 08:00), Max: 37 (04-21-21 @ 14:00)  HR: 65 (04-22-21 @ 08:00) (65 - 77)  BP: 150/58 (04-22-21 @ 08:00) (114/42 - 150/58)  RR: 15 (04-22-21 @ 08:00) (15 - 26)  SpO2: 100% (04-22-21 @ 08:00) (93% - 100%)  General - non-dysmorphic appearance, well-developed, in no distress.  Skin - no rash, no desquamation, no cyanosis.  Eyes / ENT - no conjunctival injection, sclerae anicteric, external ears & nares normal, mucous membranes moist.  Pulmonary - normal inspiratory effort, no retractions, lungs clear to auscultation bilaterally, no wheezes, no rales.  Cardiovascular - normal rate, regular rhythm, normal S1 & S2, no murmurs, no rubs, no gallops, capillary refill < 2sec, normal 2+ upper extremity pulses, 1+ lower extremity pulses.  Gastrointestinal - soft, non-distended, non-tender, no hepatosplenomegaly (liver palpable at right costal margin).  Musculoskeletal - no joint swelling, no clubbing, no edema.  Extremities +2 pulses UE B/L, +1 pulses LE B/L.  Neurologic / Psychiatric - alert, oriented as age-appropriate, affect appropriate, moves all extremities, normal tone.    LABORATORY TESTS:                          10.3  CBC:   12.87 )-----------( 178   (04-21-21 @ 08:59)                          30.2               142   |  110   |  19                 Ca: 8.3    BMP:   ----------------------------< 101    Mg: x     (04-22-21 @ 08:53)             4.2    |  24    | 1.43               Ph: x        LFT:     TPro: 5.8 / Alb: 3.5 / TBili: 0.2 / DBili: x / AST: 16 / ALT: 12 / AlkPhos: 60   (04-20-21 @ 10:02)    IMAGING STUDIES:  Electrocardiogram - (4/20/21) ectopic atrial tachycardia.    Telemetry - (4/20-4/21) sinus rhythm, frequent premature atrial contractions often in pattern of atrial bigeminy, frequent short non-sustained runs (3 to 6 beats) of ectopic atrial tachycardia.    Echocardiogram - (4/20)   1. Technically difficult imaging secondary to poor acoustic windows.   2. Follow up study after cardiac catheterization mainly to evaluate myocardial function.   3. History of mid-thoracic aorta syndrome, s/p transcatheter stenting of the infra-renal abdominal aorta. Persistent hypertension on medications.   4. S/p additional stent placement and dilatation of existing stent in the abdominal aorta 4/20/2021.   5. Hypoplasia of the aortic isthmus. There is a fibrous ridge noted in the aortic isthmus. Doppler profile shows persistence of antegrade flow in diastole (coarctation pattern). Peak systolic gradient across the aortic isthmus of 25 mmHg.   6. Continuous antegrade flow in the descending aorta at the level of diaphragm.   7. Normal right ventricular morphology with qualitatively normal size and systolic function.   8. Qualitatively normal left ventricular systolic function in the setting of irregular heart beats.   9. No pericardial effusion.

## 2021-04-22 NOTE — PROGRESS NOTE PEDS - ASSESSMENT
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  Now S/p cardiac cath with multiple stents placed in distal aorta, on 4/20/2021. Overall, doing well, since stent placement, with increasing BPs.  Noted yesterday to have RC w/ Cr to 1.67, after IVf's downtrending, not to 1.4.  RC can be secondary to a number of factors, and most likely due to a combination of those factors.  Factors include the use of contrast during cardiac cath, changed in renal perfusion following procedure (although less likely given that the stents were placed distally to bifurcation of renal artery), the patients home enalapril use, and potential UTI (most recently treated for UTI at end of 3/2021).  Plan to d/c enalapril, and start alternative anti-hypertensive agent.   UA unremarkable, and FeNa consistent w/ intrinsic process.  Cr is downtrending w/ IV hydration. Additionally,while Bps were well controlled yesterday during the day, have been more elevated throughout the day.  Given elevated Cr would advise refraining from use of ACEi/ABRs, and instead start amlodipine for blood pressure managment, in addition to the atenolol.        - to remain on atenolol 50mg qD for EAT and hypertension per cardiology  - discontinue home enalapril  - start amlodipine 10mg qD  - Plan to f/u w/ Dr. Partida  - cleared for d/c home from nephrology perspective    Plan discussed with patient, Cardiology, and PICU

## 2021-04-22 NOTE — PROGRESS NOTE PEDS - ATTENDING COMMENTS
I agree with above plan
Patient seen and examined at the bedside. I reviewed and edited the entire body of the note above so that it reflects my personal, face-to-face involvement in all specified aspects of the patient's care.
Patient seen and examined at the bedside. I reviewed and edited the entire body of the note above so that it reflects my personal, face-to-face involvement in all specified aspects of the patient's care.

## 2021-04-22 NOTE — PROGRESS NOTE PEDS - TIME BILLING
carefully reviewing all applicable data (including laboratory tests, imaging studies, etc), examining the patient, formulating a management plan, and discussing the plan in detail with the primary team, patient, and family.
carefully reviewing all applicable data (including laboratory tests, imaging studies, etc), examining the patient, formulating a management plan, and discussing the plan in detail with the primary team and patient and family.
see progress note

## 2021-04-27 ENCOUNTER — APPOINTMENT (OUTPATIENT)
Dept: PEDIATRIC NEPHROLOGY | Facility: CLINIC | Age: 21
End: 2021-04-27
Payer: COMMERCIAL

## 2021-04-27 PROCEDURE — 99072 ADDL SUPL MATRL&STAF TM PHE: CPT

## 2021-04-27 PROCEDURE — 99214 OFFICE O/P EST MOD 30 MIN: CPT

## 2021-04-27 RX ORDER — ENALAPRIL MALEATE 10 MG/1
10 TABLET ORAL TWICE DAILY
Qty: 60 | Refills: 2 | Status: DISCONTINUED | COMMUNITY
Start: 2020-03-24 | End: 2021-04-27

## 2021-04-27 NOTE — REASON FOR VISIT
[Follow-Up] : a follow-up visit for [Nephrotic Syndrome] : nephrotic syndrome [Hypertension] : ~T hypertension [Mother] : mother [Patient] : patient [FreeTextEntry3] : mid-aortic syndrome, reflux nephropathy, htn.

## 2021-05-04 LAB
BILIRUB UR QL STRIP: NEGATIVE
CLARITY UR: CLEAR
COLLECTION METHOD: NORMAL
GLUCOSE UR-MCNC: NEGATIVE
HCG UR QL: 0.2 EU/DL
HGB UR QL STRIP.AUTO: NORMAL
KETONES UR-MCNC: NEGATIVE
LEUKOCYTE ESTERASE UR QL STRIP: NORMAL
NITRITE UR QL STRIP: POSITIVE
PH UR STRIP: 6.5
PROT UR STRIP-MCNC: 30
SP GR UR STRIP: 1.01

## 2021-05-12 ENCOUNTER — LABORATORY RESULT (OUTPATIENT)
Age: 21
End: 2021-05-12

## 2021-05-12 ENCOUNTER — APPOINTMENT (OUTPATIENT)
Dept: PEDIATRIC CARDIOLOGY | Facility: CLINIC | Age: 21
End: 2021-05-12
Payer: COMMERCIAL

## 2021-05-12 ENCOUNTER — APPOINTMENT (OUTPATIENT)
Dept: PEDIATRIC RHEUMATOLOGY | Facility: CLINIC | Age: 21
End: 2021-05-12
Payer: COMMERCIAL

## 2021-05-12 VITALS — HEIGHT: 62.6 IN | WEIGHT: 164.02 LBS | BODY MASS INDEX: 29.43 KG/M2

## 2021-05-12 VITALS — SYSTOLIC BLOOD PRESSURE: 138 MMHG | DIASTOLIC BLOOD PRESSURE: 80 MMHG

## 2021-05-12 VITALS — HEART RATE: 70 BPM

## 2021-05-12 DIAGNOSIS — I73.9 PERIPHERAL VASCULAR DISEASE, UNSPECIFIED: ICD-10-CM

## 2021-05-12 DIAGNOSIS — M31.4 AORTIC ARCH SYNDROME [TAKAYASU]: ICD-10-CM

## 2021-05-12 DIAGNOSIS — Z87.440 PERSONAL HISTORY OF URINARY (TRACT) INFECTIONS: ICD-10-CM

## 2021-05-12 DIAGNOSIS — Z87.39 PERSONAL HISTORY OF OTHER DISEASES OF THE MUSCULOSKELETAL SYSTEM AND CONNECTIVE TISSUE: ICD-10-CM

## 2021-05-12 DIAGNOSIS — I49.40 UNSPECIFIED PREMATURE DEPOLARIZATION: ICD-10-CM

## 2021-05-12 DIAGNOSIS — Z91.11 PATIENT'S NONCOMPLIANCE WITH DIETARY REGIMEN: ICD-10-CM

## 2021-05-12 PROCEDURE — 99072 ADDL SUPL MATRL&STAF TM PHE: CPT

## 2021-05-12 PROCEDURE — 93000 ELECTROCARDIOGRAM COMPLETE: CPT

## 2021-05-12 PROCEDURE — 99215 OFFICE O/P EST HI 40 MIN: CPT | Mod: 25

## 2021-05-12 PROCEDURE — 99215 OFFICE O/P EST HI 40 MIN: CPT

## 2021-05-12 RX ORDER — SULFAMETHOXAZOLE AND TRIMETHOPRIM 800; 160 MG/1; MG/1
800-160 TABLET ORAL TWICE DAILY
Qty: 6 | Refills: 0 | Status: DISCONTINUED | COMMUNITY
Start: 2021-04-05 | End: 2021-05-12

## 2021-05-12 NOTE — PHYSICAL EXAM
[PERRLA] : DANA [Palate] : normal palate [S1, S2 Present] : S1, S2 present [Cardiac Auscultation] : normal cardiac auscultation  [Respiratory Effort] : normal respiratory effort [Clear to auscultation] : clear to auscultation [Soft] : soft [NonTender] : non tender [Non Distended] : non distended [Normal Bowel Sounds] : normal bowel sounds [No Hepatosplenomegaly] : no hepatosplenomegaly [No Abnormal Lymph Nodes Palpated] : no abnormal lymph nodes palpated [Range Of Motion] : full range of motion [Intact Judgement] : intact judgement  [Insight Insight] : intact insight [Acute distress] : no acute distress [Rash] : no rash [Erythematous Conjunctiva] : nonerythematous conjunctiva [Erythematous Oropharynx] : nonerythematous oropharynx [Ulcers] : no ulcers [Lesions] : no lesions [Murmurs] : no murmurs [Peripheral Edema] : no peripheral edema  [Joint effusions] : no joint effusions [FreeTextEntry5] : abdominal bruit auscultated, decreased but palpable femoral/DP/PT pulses bilaterally, normal carotid and radial pulses bilaterally [de-identified] : no current joint pain or swelling, full range of motion throughout

## 2021-05-12 NOTE — CONSULT LETTER
[Dear  ___] : Dear  [unfilled], [Courtesy Letter:] : I had the pleasure of seeing your patient, [unfilled], in my office today. [Please see my note below.] : Please see my note below. [Consult Closing:] : Thank you very much for allowing me to participate in the care of this patient.  If you have any questions, please do not hesitate to contact me. [Sincerely,] : Sincerely, [Yaquelin Wagner MD] : Yaquelin Wagner MD [[facility]] : [facility]  [FreeTextEntry2] : Linda Duffy, LEOBARDO\par [address] [place] [address]\[place] [address] [FreeTextEntry3] : Yaquelin Wagner MD\par [facility]

## 2021-05-12 NOTE — DISCUSSION/SUMMARY
[FreeTextEntry1] : In summary today, Tisha is 21 years old with history of middle aortic syndrome/Takayasu arteritis who has diffuse abdominal aortic disease.  She is status post cardiac catheterization with additional stents placement with some relief.  It is disconcerting to see diseased area of abdominal aorta that is not amenable to intervention due to origins of the celiac and renal arteries coming off from the narrowed segment.  Her recent intervention has definitely decreased the gradient from 60 to 30 mmHg that would hopefully aid in her hypertension management.  I have emphasized the need for continued surveillance and management of her hypertension by nephrology as well as inflammatory process as per rheumatology.  She also has an atrophic left kidney that could be exacerbating systemic hypertension.\par \par With regards to cardiac arrhythmias she seems to be asymptomatic.  I have placed a Holter to evaluate the frequency and type of arrhythmia that still might be asymptomatic.  Follow-up in 6 months is advised.

## 2021-05-12 NOTE — CARDIOLOGY SUMMARY
[Today's Date] : [unfilled] [FreeTextEntry1] : Normal sinus rhythm.  Occasional PACs were seen. [de-identified] : Placed today

## 2021-05-12 NOTE — HISTORY OF PRESENT ILLNESS
[FreeTextEntry1] : We had the pleasure of meeting Tisha Dorman for follow up evaluation at [facility] on May 12th,2021.\par \par Tisha comes in today for follow-up since her cardiac catheterization on April 20, 2021.  As you well know she is 21 years old who has history of middle aortic syndrome status post stenting of the infrarenal aorta.  Cardiac catheterization was performed to evaluate the hemodynamics with an intent to intervene.  Below is the brief summary of the procedure:\par \par 1.  Cardiac output was normal with normal filling pressures of 6 to 8 mmHg\par 2.  Right heart pressures were normal.\par 3.  There was evidence of multilevel obstruction from left ventricle to femoral artery with a combined gradient of 60 mmHg.\par 4.  2 stents were placed in the distal descending aorta within the previous stent and slightly proximal to the previous stents and expanded up to 7 mm in diameter.  Post intervention there was a persistent gradient of 30 from the ascending aorta to the femoral artery mainly at the origins of the abdominal and renal vessels.  There was no obvious renal artery stenosis.\par \par Postprocedure she was placed on atenolol 50 mg once a day and amlodipine 10 mg once a day with better management of her hypertension.  She was also noticed to have frequent ventricular arrhythmias therefore atenolol was chosen as a medication to control hypertension as well as the arrhythmia.  She denies any headaches, leg cramping or palpitation.  She will be evaluated by rheumatology today for her ongoing treatment as well.  She was also seen by her nephrologist post cardiac catheterization.She is also on aspirin.

## 2021-05-12 NOTE — CONSULT LETTER
[Today's Date] : [unfilled] [Name] : Name: [unfilled] [] : : ~~ [Today's Date:] : [unfilled] [Dear  ___:] : Dear Dr. [unfilled]: [Consult] : I had the pleasure of evaluating your patient, [unfilled]. My full evaluation follows. [Consult - Single Provider] : Thank you very much for allowing me to participate in the care of this patient. If you have any questions, please do not hesitate to contact me. [Sincerely,] : Sincerely, [DrDonald  ___] : Dr. BONILLA [DrDonald ___] : Dr. BONILLA [FreeTextEntry4] : Benito Partida [FreeTextEntry5] : Nephrology [FreeTextEntry6] : [facility] [de-identified] : \par \par Julien Bhatt MD\par Director, Pediatric catheterization Lab\par [facility]\par , [employer]\par Telephone: [phone]\par [phone]\par \par

## 2021-05-12 NOTE — REASON FOR VISIT
[Follow-Up] : a follow-up visit for [Systemic Hypertension] : systemic hypertension [Coarctation Of The Aorta] : coarctation of the aorta [Patient] : patient [FreeTextEntry1] : MidAortic Syndrome

## 2021-05-12 NOTE — REASON FOR VISIT
[Follow-Up: _____] : [unfilled] is  being seen for a [unfilled] follow-up visit [Patient] : patient [Other: _____] : [unfilled]

## 2021-05-12 NOTE — HISTORY OF PRESENT ILLNESS
[FreeTextEntry1] : Since last visit she is s/p cardiac cath with repeat stent placement in infrarenal aorta.  She had elevation of creatinine during admission up to 1.62 (thought to be post-procedural vs. contrast induced vs. Enalapril) and with IV fluids it improved to 1.43.  Enalapril was discontinued and amlodipine was added instead for hypertension.  Renal U/S with doppler with known atrophic left kidney, stable right kidney.  Also developed ventricular arrhythmia during her PICU course and was started on nadolol followed by transition to atenolol with improvement.  \par \par She saw cardiology today and Holter monitor was placed to continue monitoring of prior arrhythmia.  \par \par She reports since her procedure that intermittent pain/claudication in legs is improved, and blood pressures that she monitors at home have been better, usually 130s/80s.\par \par Still has not rescheduled to have brain MRI/MRA/MRV - encouraged to schedule as soon as possible.  Headaches improved recently.\par \par She is tolerating Humira - has now had 3 doses.  Had issues with most recent dose - did not hold against her skin properly and medication was not delivered into leg so needed to repeat and had no further issues.\par \par She denies any dizziness/syncope and denies any chest pain or palpitations. No focal neurological deficits.\par \par No joint pain or swelling noted. \par \par Is constipated - stools twice a week with straining. No nausea/emesis/diarrhea/severe abdominal pain/blood in stool.\par \par Patient has been well with no fever, cough, shortness of breath, abdominal upset, or other illness symptoms recently.  Family/household members are also well.  No known covid+ contacts.  Patient/family is practicing social distancing. \par \par No rash. No eye pain/redness/change in vision. No sores in the mouth or nose. No difficulty swallowing. No weakness. No urinary changes. No other new symptoms.\par \par  [Weight Loss] : no weight loss [Malaise] : no malaise [Fever] : no fever [Malar Facial Rash] : no malar facial rash [Skin Lesions] : no skin lesions [Oral Ulcers] : no oral ulcers [Chest Pain] : no chest pain [Arthralgias] : no arthralgias [Joint Swelling] : no joint swelling [Difficulty Walking] : no difficulty walking [Dyspnea] : no dyspnea [Myalgias] : no myalgias [Muscle Weakness] : no muscle weakness [Muscle Spasms] : no muscle spasms [Visual Changes] : no visual changes [Eye Pain] : no eye pain [Eye Redness] : no eye redness

## 2021-05-13 LAB
ALBUMIN SERPL ELPH-MCNC: 4.4 G/DL
ALP BLD-CCNC: 100 U/L
ALT SERPL-CCNC: 19 U/L
ANION GAP SERPL CALC-SCNC: 11 MMOL/L
APPEARANCE: CLEAR
AST SERPL-CCNC: 12 U/L
BASOPHILS # BLD AUTO: 0.07 K/UL
BASOPHILS NFR BLD AUTO: 0.9 %
BILIRUB SERPL-MCNC: 0.3 MG/DL
BILIRUBIN URINE: NEGATIVE
BLOOD URINE: NEGATIVE
BUN SERPL-MCNC: 18 MG/DL
CALCIUM SERPL-MCNC: 9 MG/DL
CHLORIDE SERPL-SCNC: 110 MMOL/L
CO2 SERPL-SCNC: 21 MMOL/L
COLOR: NORMAL
CREAT SERPL-MCNC: 1.27 MG/DL
CREAT SPEC-SCNC: 54 MG/DL
CREAT/PROT UR: 0.2 RATIO
CRP SERPL-MCNC: <3 MG/L
EOSINOPHIL # BLD AUTO: 0.12 K/UL
EOSINOPHIL NFR BLD AUTO: 1.5 %
ERYTHROCYTE [SEDIMENTATION RATE] IN BLOOD BY WESTERGREN METHOD: 9 MM/HR
GLUCOSE QUALITATIVE U: NEGATIVE
GLUCOSE SERPL-MCNC: 90 MG/DL
HCT VFR BLD CALC: 36.8 %
HGB BLD-MCNC: 12.1 G/DL
IMM GRANULOCYTES NFR BLD AUTO: 0.2 %
KETONES URINE: NEGATIVE
LEUKOCYTE ESTERASE URINE: ABNORMAL
LYMPHOCYTES # BLD AUTO: 2.62 K/UL
LYMPHOCYTES NFR BLD AUTO: 32.1 %
MAN DIFF?: NORMAL
MCHC RBC-ENTMCNC: 29.1 PG
MCHC RBC-ENTMCNC: 32.9 GM/DL
MCV RBC AUTO: 88.5 FL
MONOCYTES # BLD AUTO: 0.52 K/UL
MONOCYTES NFR BLD AUTO: 6.4 %
NEUTROPHILS # BLD AUTO: 4.82 K/UL
NEUTROPHILS NFR BLD AUTO: 58.9 %
NITRITE URINE: POSITIVE
PH URINE: 6.5
PLATELET # BLD AUTO: 204 K/UL
POTASSIUM SERPL-SCNC: 4.7 MMOL/L
PROT SERPL-MCNC: 7 G/DL
PROT UR-MCNC: 13 MG/DL
PROTEIN URINE: NORMAL
RBC # BLD: 4.16 M/UL
RBC # FLD: 14.6 %
SODIUM SERPL-SCNC: 141 MMOL/L
SPECIFIC GRAVITY URINE: 1.01
UROBILINOGEN URINE: NORMAL
VWF AG PPP IA-ACNC: 167 %
WBC # FLD AUTO: 8.17 K/UL

## 2021-05-17 ENCOUNTER — NON-APPOINTMENT (OUTPATIENT)
Age: 21
End: 2021-05-17

## 2021-05-24 LAB
ADALIMUMAB AB SERPL-MCNC: <25 NG/ML
ADALIMUMAB SERPL-MCNC: 3.9 UG/ML

## 2021-06-15 ENCOUNTER — APPOINTMENT (OUTPATIENT)
Dept: PEDIATRIC NEPHROLOGY | Facility: CLINIC | Age: 21
End: 2021-06-15
Payer: COMMERCIAL

## 2021-06-15 VITALS
BODY MASS INDEX: 29.11 KG/M2 | WEIGHT: 158.2 LBS | DIASTOLIC BLOOD PRESSURE: 80 MMHG | SYSTOLIC BLOOD PRESSURE: 148 MMHG | HEART RATE: 47 BPM | HEIGHT: 62 IN

## 2021-06-15 VITALS — SYSTOLIC BLOOD PRESSURE: 168 MMHG | HEART RATE: 44 BPM | DIASTOLIC BLOOD PRESSURE: 103 MMHG

## 2021-06-15 DIAGNOSIS — N26.1 ATROPHY OF KIDNEY (TERMINAL): ICD-10-CM

## 2021-06-15 PROCEDURE — 99214 OFFICE O/P EST MOD 30 MIN: CPT

## 2021-06-15 NOTE — REASON FOR VISIT
[Follow-Up] : a follow-up visit for [Patient] : patient [Mother] : mother [FreeTextEntry3] : mid-aortic syndrome, reflux nephropathy, HTN, Takayasu Syndrome. S/P angioplasty

## 2021-06-21 LAB
BILIRUB UR QL STRIP: NEGATIVE
CLARITY UR: CLEAR
COLLECTION METHOD: NORMAL
GLUCOSE UR-MCNC: NEGATIVE
HCG UR QL: 0.2 EU/DL
HGB UR QL STRIP.AUTO: NORMAL
KETONES UR-MCNC: NEGATIVE
LEUKOCYTE ESTERASE UR QL STRIP: NORMAL
NITRITE UR QL STRIP: POSITIVE
PH UR STRIP: 5.5
PROT UR STRIP-MCNC: NORMAL
SP GR UR STRIP: 1.01

## 2021-06-22 ENCOUNTER — APPOINTMENT (OUTPATIENT)
Dept: PEDIATRIC NEPHROLOGY | Facility: CLINIC | Age: 21
End: 2021-06-22
Payer: COMMERCIAL

## 2021-06-22 VITALS — BODY MASS INDEX: 29.08 KG/M2 | HEIGHT: 62 IN | WEIGHT: 158 LBS

## 2021-06-22 VITALS — SYSTOLIC BLOOD PRESSURE: 150 MMHG | HEART RATE: 73 BPM | DIASTOLIC BLOOD PRESSURE: 64 MMHG

## 2021-06-22 VITALS — TEMPERATURE: 97.9 F

## 2021-06-22 PROCEDURE — 99214 OFFICE O/P EST MOD 30 MIN: CPT

## 2021-06-22 RX ORDER — ATENOLOL 50 MG/1
50 TABLET ORAL DAILY
Qty: 15 | Refills: 5 | Status: ACTIVE | COMMUNITY
Start: 2021-04-27

## 2021-06-22 NOTE — PHYSICAL EXAM
[Well Developed] : well developed [Well Nourished] : well nourished [Normal] : alert, oriented as age-appropriate, affect appropriate; no weakness, no facial asymmetry, moves all extremities normal gait- child older than 18 months [de-identified] : bradycardia with escape beats

## 2021-06-22 NOTE — REASON FOR VISIT
[Follow-Up] : a follow-up visit for [Patient] : patient [FreeTextEntry3] : mid aortic syndrome, HTN, reflux nephropathy, suspected Takayasu Syndrome

## 2021-07-12 ENCOUNTER — APPOINTMENT (OUTPATIENT)
Dept: PEDIATRIC RHEUMATOLOGY | Facility: CLINIC | Age: 21
End: 2021-07-12

## 2021-07-20 ENCOUNTER — APPOINTMENT (OUTPATIENT)
Dept: PEDIATRIC NEPHROLOGY | Facility: CLINIC | Age: 21
End: 2021-07-20

## 2021-07-27 ENCOUNTER — APPOINTMENT (OUTPATIENT)
Dept: PEDIATRIC NEPHROLOGY | Facility: CLINIC | Age: 21
End: 2021-07-27
Payer: COMMERCIAL

## 2021-07-27 VITALS
HEIGHT: 62 IN | HEART RATE: 47 BPM | SYSTOLIC BLOOD PRESSURE: 126 MMHG | BODY MASS INDEX: 28.34 KG/M2 | WEIGHT: 154 LBS | DIASTOLIC BLOOD PRESSURE: 56 MMHG

## 2021-07-27 PROCEDURE — 99214 OFFICE O/P EST MOD 30 MIN: CPT

## 2021-07-27 NOTE — PHYSICAL EXAM
[Well Developed] : well developed [Well Nourished] : well nourished [Normal] : alert, oriented as age-appropriate, affect appropriate; no weakness, no facial asymmetry, moves all extremities normal gait- child older than 18 months [de-identified] : bradycardia with escape beats

## 2021-07-29 ENCOUNTER — NON-APPOINTMENT (OUTPATIENT)
Age: 21
End: 2021-07-29

## 2021-08-03 ENCOUNTER — APPOINTMENT (OUTPATIENT)
Dept: PEDIATRIC RHEUMATOLOGY | Facility: CLINIC | Age: 21
End: 2021-08-03

## 2021-08-03 ENCOUNTER — APPOINTMENT (OUTPATIENT)
Dept: PEDIATRIC NEPHROLOGY | Facility: CLINIC | Age: 21
End: 2021-08-03

## 2021-08-25 ENCOUNTER — RX RENEWAL (OUTPATIENT)
Age: 21
End: 2021-08-25

## 2021-08-25 RX ORDER — ADALIMUMAB 40MG/0.4ML
40 KIT SUBCUTANEOUS
Qty: 1 | Refills: 0 | Status: ACTIVE | COMMUNITY
Start: 2021-03-03 | End: 1900-01-01

## 2021-09-02 ENCOUNTER — NON-APPOINTMENT (OUTPATIENT)
Age: 21
End: 2021-09-02

## 2021-09-07 ENCOUNTER — NON-APPOINTMENT (OUTPATIENT)
Age: 21
End: 2021-09-07

## 2021-09-14 ENCOUNTER — APPOINTMENT (OUTPATIENT)
Dept: PEDIATRIC RHEUMATOLOGY | Facility: CLINIC | Age: 21
End: 2021-09-14

## 2021-10-04 ENCOUNTER — APPOINTMENT (OUTPATIENT)
Dept: UROGYNECOLOGY | Facility: CLINIC | Age: 21
End: 2021-10-04

## 2021-10-26 ENCOUNTER — APPOINTMENT (OUTPATIENT)
Dept: PEDIATRIC NEPHROLOGY | Facility: CLINIC | Age: 21
End: 2021-10-26
Payer: COMMERCIAL

## 2021-10-26 VITALS
HEART RATE: 67 BPM | BODY MASS INDEX: 26.31 KG/M2 | WEIGHT: 144.8 LBS | HEIGHT: 62.17 IN | DIASTOLIC BLOOD PRESSURE: 67 MMHG | SYSTOLIC BLOOD PRESSURE: 150 MMHG

## 2021-10-26 DIAGNOSIS — N39.0 URINARY TRACT INFECTION, SITE NOT SPECIFIED: ICD-10-CM

## 2021-10-26 DIAGNOSIS — M31.4 AORTIC ARCH SYNDROME [TAKAYASU]: ICD-10-CM

## 2021-10-26 PROCEDURE — 99214 OFFICE O/P EST MOD 30 MIN: CPT

## 2021-10-26 NOTE — PHYSICAL EXAM
[Well Developed] : well developed [Well Nourished] : well nourished [Normal] : alert, oriented as age-appropriate, affect appropriate; no weakness, no facial asymmetry, moves all extremities normal gait- child older than 18 months [de-identified] : bradycardia with escape beats

## 2021-11-02 LAB
BILIRUB UR QL STRIP: NEGATIVE
CLARITY UR: CLEAR
COLLECTION METHOD: NORMAL
GLUCOSE UR-MCNC: NEGATIVE
HCG UR QL: 0.2 EU/DL
HGB UR QL STRIP.AUTO: NEGATIVE
KETONES UR-MCNC: NEGATIVE
LEUKOCYTE ESTERASE UR QL STRIP: NORMAL
NITRITE UR QL STRIP: NEGATIVE
PH UR STRIP: 6
PROT UR STRIP-MCNC: NEGATIVE
SP GR UR STRIP: 1.01

## 2021-11-30 ENCOUNTER — APPOINTMENT (OUTPATIENT)
Dept: PEDIATRIC NEPHROLOGY | Facility: CLINIC | Age: 21
End: 2021-11-30

## 2021-12-26 ENCOUNTER — NON-APPOINTMENT (OUTPATIENT)
Age: 21
End: 2021-12-26

## 2022-01-13 ENCOUNTER — APPOINTMENT (OUTPATIENT)
Dept: UROGYNECOLOGY | Facility: CLINIC | Age: 22
End: 2022-01-13

## 2022-02-02 ENCOUNTER — RX RENEWAL (OUTPATIENT)
Age: 22
End: 2022-02-02

## 2022-02-21 NOTE — ASU PATIENT PROFILE, PEDIATRIC - TEACHING/LEARNING FACTORS IMPACT ABILITY TO LEARN PEDS
Prior auth request received:    Med:[email or web address]  Pharmacy:  [facility] [employer]:  [id number]  ID:  [id number]    See original on nurse desk or in mailbox   none

## 2022-03-08 ENCOUNTER — APPOINTMENT (OUTPATIENT)
Dept: PEDIATRIC NEPHROLOGY | Facility: CLINIC | Age: 22
End: 2022-03-08
Payer: COMMERCIAL

## 2022-03-08 VITALS — HEART RATE: 61 BPM | SYSTOLIC BLOOD PRESSURE: 163 MMHG | DIASTOLIC BLOOD PRESSURE: 127 MMHG

## 2022-03-08 VITALS
HEART RATE: 62 BPM | WEIGHT: 152 LBS | DIASTOLIC BLOOD PRESSURE: 68 MMHG | SYSTOLIC BLOOD PRESSURE: 147 MMHG | BODY MASS INDEX: 27.62 KG/M2 | HEIGHT: 62.13 IN

## 2022-03-08 DIAGNOSIS — N13.729 VESICOURETERAL-REFLUX WITH REFLUX NEPHROPATHY W/OUT HYDROURETER, UNSPECIFIED: ICD-10-CM

## 2022-03-08 DIAGNOSIS — N18.2 CHRONIC KIDNEY DISEASE, STAGE 2 (MILD): ICD-10-CM

## 2022-03-08 DIAGNOSIS — Q25.1 COARCTATION OF AORTA: ICD-10-CM

## 2022-03-08 DIAGNOSIS — I10 ESSENTIAL (PRIMARY) HYPERTENSION: ICD-10-CM

## 2022-03-08 PROCEDURE — 99214 OFFICE O/P EST MOD 30 MIN: CPT

## 2022-03-08 RX ORDER — ENALAPRIL MALEATE 10 MG/1
10 TABLET ORAL TWICE DAILY
Qty: 90 | Refills: 2 | Status: ACTIVE | COMMUNITY
Start: 1900-01-01 | End: 1900-01-01

## 2022-03-08 NOTE — PHYSICAL EXAM
[Well Developed] : well developed [Well Nourished] : well nourished [Normal] : alert, oriented as age-appropriate, affect appropriate; no weakness, no facial asymmetry, moves all extremities normal gait- child older than 18 months [de-identified] : bradycardia with escape beats

## 2022-03-10 ENCOUNTER — APPOINTMENT (OUTPATIENT)
Dept: UROGYNECOLOGY | Facility: CLINIC | Age: 22
End: 2022-03-10
Payer: COMMERCIAL

## 2022-03-10 ENCOUNTER — APPOINTMENT (OUTPATIENT)
Dept: UROGYNECOLOGY | Facility: CLINIC | Age: 22
End: 2022-03-10

## 2022-03-10 VITALS
SYSTOLIC BLOOD PRESSURE: 159 MMHG | BODY MASS INDEX: 27.6 KG/M2 | DIASTOLIC BLOOD PRESSURE: 82 MMHG | HEART RATE: 70 BPM | HEIGHT: 62 IN | WEIGHT: 150 LBS

## 2022-03-10 DIAGNOSIS — R82.90 UNSPECIFIED ABNORMAL FINDINGS IN URINE: ICD-10-CM

## 2022-03-10 DIAGNOSIS — Z51.81 ENCOUNTER FOR THERAPEUTIC DRUG LVL MONITORING: ICD-10-CM

## 2022-03-10 DIAGNOSIS — Z79.899 OTHER LONG TERM (CURRENT) DRUG THERAPY: ICD-10-CM

## 2022-03-10 DIAGNOSIS — Z87.898 PERSONAL HISTORY OF OTHER SPECIFIED CONDITIONS: ICD-10-CM

## 2022-03-10 DIAGNOSIS — N89.8 OTHER SPECIFIED NONINFLAMMATORY DISORDERS OF VAGINA: ICD-10-CM

## 2022-03-10 DIAGNOSIS — N13.70 VESICOURETERAL-REFLUX, UNSPECIFIED: ICD-10-CM

## 2022-03-10 DIAGNOSIS — Z01.818 ENCOUNTER FOR OTHER PREPROCEDURAL EXAMINATION: ICD-10-CM

## 2022-03-10 DIAGNOSIS — N39.41 URGE INCONTINENCE: ICD-10-CM

## 2022-03-10 PROCEDURE — 99214 OFFICE O/P EST MOD 30 MIN: CPT

## 2022-03-10 RX ORDER — FLUCONAZOLE 150 MG/1
150 TABLET ORAL
Qty: 2 | Refills: 0 | Status: ACTIVE | COMMUNITY
Start: 2022-03-10 | End: 1900-01-01

## 2022-03-10 NOTE — DISCUSSION/SUMMARY
[FreeTextEntry1] : \par Urge Incontinence:\par Bladder sono ordered for PVR check. Spoke to patient and to her mom on the phone. \par If she empties well, then we can prescribe Oxybutynin 15mg.\par Will return in 12 months for follow up, or earlier if she has any issues\par \par Vaginal Irritation:\par Affirm culture obtained.\par Empirically treated with Fluconazole 150 mg, one tablet today and one in 48 hours.\par Advised to avoid sexual intercourse while partner is being treated for yeast infection. \par \par \par

## 2022-03-10 NOTE — PHYSICAL EXAM
[Chaperone Present] : A chaperone was present in the examining room during all aspects of the physical examination [No Acute Distress] : in no acute distress [Well developed] : well developed [Well Nourished] : ~L well nourished [FreeTextEntry1] : No discharge visualized on the vulva or the vagina\par

## 2022-03-10 NOTE — COUNSELING
[FreeTextEntry1] : \par We will notify you of the vaginal culture results if they are abnormal\par \par Please schedule an appointment for a bladder scan to check that you are emptying your bladder well. Please call us once you schedule that appointment so that we can follow up with the results. \par \par As long as you empty well, we will prescribe oxybutynin 15 mg daily and will schedule a follow up appointment in 12 months. \par \par Please take Fluconazole, one pill today, another pill in 48 hours. \par \par Follow up will be based on bladder scan results. \par

## 2022-03-10 NOTE — HISTORY OF PRESENT ILLNESS
[FreeTextEntry1] : Patient is here for 16 months med check for urge incontinence.\par Last seen on 10/1/2020 for med check.\par \par hx of aortic coarctation, hx of middle aortic syndrome s/p stent\par hx of vesicoureteral reflux with ureteral reimplantation at 1 year of age, on oxybutynin ER 10mg x 5 years by pediatric urologist (since 2015)\par \par PVR check attempted in the office as new pt visit, unable to pass 12F catheter through the whole length of urethra. Bladder scan ordered for PVR check. \par \par Patient was hospitalized for UTI with fever and chills in September, she states that she had bladder sonogram at the hospital but was unable to send the report. \par \par Today, patient states she is happy with Oxybutynin ER 10mg QD and is noticing 80-85% improvement. Denies side effects. Patient does not feel she has a urinary tract infection. Patient is complaining of vaginal irritation for the past 2 days. States that her boyfriend has been having yeast infections. \par \par Patient would like to increase the dosage of Oxybutynin.

## 2022-03-14 ENCOUNTER — NON-APPOINTMENT (OUTPATIENT)
Age: 22
End: 2022-03-14

## 2022-03-14 LAB
CANDIDA VAG CYTO: DETECTED
G VAGINALIS+PREV SP MTYP VAG QL MICRO: DETECTED
T VAGINALIS VAG QL WET PREP: NOT DETECTED

## 2022-03-14 RX ORDER — METRONIDAZOLE 500 MG/1
500 TABLET ORAL
Qty: 14 | Refills: 0 | Status: COMPLETED | COMMUNITY
Start: 2022-03-14 | End: 2022-03-21

## 2022-03-15 LAB
BILIRUB UR QL STRIP: NEGATIVE
CLARITY UR: CLEAR
GLUCOSE UR-MCNC: NEGATIVE
HCG UR QL: 0.2 EU/DL
HGB UR QL STRIP.AUTO: NEGATIVE
KETONES UR-MCNC: NEGATIVE
LEUKOCYTE ESTERASE UR QL STRIP: ABNORMAL
NITRITE UR QL STRIP: NEGATIVE
PH UR STRIP: 6
PROT UR STRIP-MCNC: NEGATIVE
SP GR UR STRIP: 1.01

## 2022-07-01 NOTE — ASU PREOP CHECKLIST, PEDIATRIC - LAST TOOK
clears [Loss Of Hearing] : hearing loss [As Noted in HPI] : as noted in HPI [Negative] : Neurological [Fever] : no fever [Chest Pain] : no chest pain [Palpitations] : no palpitations [Cough] : no cough [Sleep Disturbances] : no sleep disturbances

## 2022-08-16 ENCOUNTER — RX RENEWAL (OUTPATIENT)
Age: 22
End: 2022-08-16

## 2022-08-16 RX ORDER — AMLODIPINE BESYLATE 10 MG/1
10 TABLET ORAL
Qty: 30 | Refills: 3 | Status: ACTIVE | COMMUNITY
Start: 2021-02-23 | End: 1900-01-01

## 2022-09-13 ENCOUNTER — RX RENEWAL (OUTPATIENT)
Age: 22
End: 2022-09-13

## 2022-12-13 ENCOUNTER — RX RENEWAL (OUTPATIENT)
Age: 22
End: 2022-12-13

## 2023-01-24 ENCOUNTER — RX RENEWAL (OUTPATIENT)
Age: 23
End: 2023-01-24

## 2023-02-14 ENCOUNTER — RX RENEWAL (OUTPATIENT)
Age: 23
End: 2023-02-14

## 2023-04-17 NOTE — PROGRESS NOTE PEDS - SUBJECTIVE AND OBJECTIVE BOX
Injectafer x 2  RTC 4 months with labs 3 days prior  Patient is a 21y old  Female who presents with a chief complaint of Presurgical Assessment/testing for: Bilateral heart cath aortic stent on 2021 at [facility]  Doctor: Julien Bhatt (2021 09:53)    Interval History:  Overall, doing well since procedure. Restarted on home medication regimen. Denies any pain  Was started on nadolol per cardiology given EAT.   BPs since coming to PICU ranging form /43-78.      MEDICATIONS  (STANDING):  aspirin  Oral Tab/Cap - Peds 325 milliGRAM(s) Oral daily  enalapril Oral Tab/Cap - Peds 10 milliGRAM(s) Oral two times a day  nadolol Oral Tab/Cap - Peds 40 milliGRAM(s) Oral daily  oxybutynin Oral Tab/Cap - Peds 10 milliGRAM(s) Oral <User Schedule>  sodium chloride 0.9%. - Pediatric 1000 milliLiter(s) (70 mL/Hr) IV Continuous <Continuous>    MEDICATIONS  (PRN):  acetaminophen   Oral Tab/Cap - Peds. 650 milliGRAM(s) Oral every 6 hours PRN Mild Pain (1 - 3)      Vital Signs Last 24 Hrs  T(C): 37 (2021 11:00), Max: 37 (2021 05:00)  T(F): 98.6 (2021 11:00), Max: 98.6 (2021 05:00)  HR: 58 (2021 11:00) (0 - 97)  BP: 124/66 (2021 11:00) (98/60 - 140/72)  BP(mean): 77 (2021 11:00) (56 - 91)  RR: 17 (2021 11:00) (0 - 21)  SpO2: 99% (2021 11:00) (93% - 100%)  I&O's Detail    2021 07:01  -  2021 07:00  --------------------------------------------------------  IN:    Heparin Infusion - Pediatric/: 113.6 mL    IV PiggyBack: 250 mL    Oral Fluid: 1320 mL  Total IN: 1683.6 mL    OUT:    Voided (mL): 1150 mL  Total OUT: 1150 mL    Total NET: 533.6 mL      2021 07:01  -  2021 11:51  --------------------------------------------------------  IN:    Heparin Infusion - Pediatric/: 21.3 mL    Lactated Ringers Bolus - Pediatric: 1000 mL    Oral Fluid: 240 mL  Total IN: 1261.3 mL    OUT:  Total OUT: 0 mL    Total NET: 1261.3 mL        Daily Height/Length in cm: 158.7 (2021 07:45), Height/Length in cm: 158.7 (2021 07:45), Height/Length in cm: 158.7 (2021 07:45)    Daily     Physical Exam  General: No apparent distress, comfortable, sitting up in bed  HENT: NC/AT, external ear normal, normal nares with no discharte, no pharyngeal exudates/erythema, moist oral mucosa  Eyes: EUGENIO, EOMI, no conjunctival injection, sclera non-icteric, no discharge  Neck: supple, full range of motion, no lymphadenopathy  Heart: Regular rate and rhythm, normal s1/s2, no murmurs/rubs/gallops  Lungs: Clear to ascultation bilaterally, good air entry to bases, no wheezing or crackles, no retractions  Abdomen: Soft, non-tender, non-distended, bowel sounds appreciated, no masses, no organomegaly  Extremities: Warm, cap refill <2s, no edema, symmetric pulses  Skin: intact, no rashes or lesions  Neuro: Awake, alert, oriented, appropriately responsive, no facial asymmetry, moves all extremities    Lab Results:                        10.3   12.87 )-----------( 178      ( 2021 08:59 )             30.2     CBC Full  -  ( 2021 08:59 )  WBC Count : 12.87 K/uL  RBC Count : 3.57 M/uL  Hemoglobin : 10.3 g/dL  Hematocrit : 30.2 %  Platelet Count - Automated : 178 K/uL  Mean Cell Volume : 84.6 fL  Mean Cell Hemoglobin : 28.9 pg  Mean Cell Hemoglobin Concentration : 34.1 gm/dL  Auto Neutrophil # : 9.50 K/uL  Auto Lymphocyte # : 2.96 K/uL  Auto Monocyte # : 0.70 K/uL  Auto Eosinophil # : 0.01 K/uL  Auto Basophil # : 0.04 K/uL  Auto Neutrophil % : 71.7 %  Auto Lymphocyte % : 22.3 %  Auto Monocyte % : 5.3 %  Auto Eosinophil % : 0.1 %  Auto Basophil % : 0.3 %      2021 09:08    140    |  108    |  28     ----------------------------<  99     4.5     |  22     |  1.67     2021 10:02    140    |  111    |  20     ----------------------------<  106    4.4     |  21     |  1.32     Ca    8.5        2021 09:08  Ca    8.5        2021 10:02  Phos  3.4       2021 09:08  Mg     1.8       2021 09:08    TPro  5.8    /  Alb  3.5    /  TBili  0.2    /  DBili  x      /  AST  16     /  ALT  12     /  AlkPhos  60     2021 10:02          Sodium, Random Urine: 65 mmol/L (21 @ 11:34)      Microbiology:  None    Radiology:  none

## 2023-04-30 NOTE — H&P PST PEDIATRIC - NS CRAFFT PART A VALIDATION ALCOHOL
Pre-Procedural Saturation was taken from the Left Hand. Sat result is 99% Patient answered NO to all of the above 3 questions...

## 2023-09-29 NOTE — ED PEDIATRIC NURSE REASSESSMENT NOTE - CARDIO WDL
Normal rate, regular rhythm, normal S1, S2 heart sounds heard.
100

## 2024-02-06 NOTE — H&P PST PEDIATRIC - PSH
well developed, well nourished , in no acute distress , ambulating without difficulty , normal communication ability
Aorta disorder  middle aortic syndrome s/p stent  placement infrarenal portion of the abdominal aorta 7/2016  H/O tooth extraction  with local anesthesia

## 2024-10-17 NOTE — H&P PST ADULT - NEGATIVE ENMT SYMPTOMS
no nasal congestion/no nasal discharge/no nasal obstruction/no nose bleeds/no throat pain/no dysphagia ACP